# Patient Record
Sex: FEMALE | Race: WHITE | HISPANIC OR LATINO | Employment: UNEMPLOYED | URBAN - METROPOLITAN AREA
[De-identification: names, ages, dates, MRNs, and addresses within clinical notes are randomized per-mention and may not be internally consistent; named-entity substitution may affect disease eponyms.]

---

## 2020-12-21 ENCOUNTER — OFFICE VISIT (OUTPATIENT)
Dept: PSYCHIATRY | Facility: CLINIC | Age: 55
End: 2020-12-21
Payer: MEDICAID

## 2020-12-21 DIAGNOSIS — F33.1 DEPRESSION, MAJOR, RECURRENT, MODERATE: Primary | ICD-10-CM

## 2020-12-21 DIAGNOSIS — F41.9 ANXIETY DISORDER, UNSPECIFIED TYPE: ICD-10-CM

## 2020-12-21 DIAGNOSIS — F41.0 PANIC DISORDER WITHOUT AGORAPHOBIA: ICD-10-CM

## 2020-12-21 PROCEDURE — 90792 PR PSYCHIATRIC DIAGNOSTIC EVALUATION W/MEDICAL SERVICES: ICD-10-PCS | Mod: 95,AF,HB, | Performed by: PSYCHIATRY & NEUROLOGY

## 2020-12-21 PROCEDURE — 90792 PSYCH DIAG EVAL W/MED SRVCS: CPT | Mod: 95,AF,HB, | Performed by: PSYCHIATRY & NEUROLOGY

## 2020-12-21 RX ORDER — LORAZEPAM 0.5 MG/1
0.5 TABLET ORAL 3 TIMES DAILY PRN
Qty: 90 TABLET | Refills: 2 | Status: SHIPPED | OUTPATIENT
Start: 2020-12-21 | End: 2021-01-19 | Stop reason: SDUPTHER

## 2020-12-21 RX ORDER — ESCITALOPRAM OXALATE 10 MG/1
10 TABLET ORAL DAILY
Qty: 30 TABLET | Refills: 2 | Status: SHIPPED | OUTPATIENT
Start: 2020-12-21 | End: 2021-01-19 | Stop reason: SDUPTHER

## 2020-12-21 RX ORDER — MIRTAZAPINE 15 MG/1
TABLET, FILM COATED ORAL
Qty: 45 TABLET | Refills: 2 | Status: SHIPPED | OUTPATIENT
Start: 2020-12-21 | End: 2020-12-29
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
## 2020-12-26 ENCOUNTER — PATIENT MESSAGE (OUTPATIENT)
Dept: PSYCHIATRY | Facility: CLINIC | Age: 55
End: 2020-12-26

## 2020-12-29 PROBLEM — F41.9 ANXIETY DISORDER: Status: ACTIVE | Noted: 2020-12-29

## 2020-12-29 PROBLEM — F41.0 PANIC DISORDER WITHOUT AGORAPHOBIA: Status: ACTIVE | Noted: 2020-12-29

## 2020-12-29 PROBLEM — F33.1 DEPRESSION, MAJOR, RECURRENT, MODERATE: Status: ACTIVE | Noted: 2020-12-29

## 2020-12-29 RX ORDER — MIRTAZAPINE 30 MG/1
30 TABLET, FILM COATED ORAL NIGHTLY
Qty: 30 TABLET | Refills: 2 | Status: SHIPPED | OUTPATIENT
Start: 2020-12-29 | End: 2021-01-19 | Stop reason: SDUPTHER

## 2020-12-29 NOTE — PATIENT INSTRUCTIONS
PLAN:     FOLLOW UP   With D Post Donaldo BLANCHARD on 1- a 3pm  at [facility]    Meds: initial plan to advance remeron from 15 mg to 1 and 1/2 tab tho pharma not approve; as such will move to 30 mg; also wt 100#    References:     Relaxation stress reduction workbook: CATHERINE Toney PhD ( used: $7-10)    Feeling Good Website: Kei Bolanos MD / www.Foxtrot website (free) / eduardo. PODCASTS    Anxiety &  phobia workbook by ERIC Thompson PhD  (web retailers: used: $ 7-10)    [place]: Path to Better Sleep : https://www.veterantraining.[place].gov/insomnia/ (free)       Pt expressed appreciation for the visit today and did not have further question at this time though pt  was still informed to:     Call  if problems.    Call / Report Side Effects to Donaldo BLANCHARD     Encouraged to follow up with primary care / Gen Med MD for continued monitoring of general health and wellness.    Understanding was expressed; and no further concerns nor questions were raised at this time.     remember healthy self care:   eat right  attempt adequate rest   HANDWASHING / encourage such eduardo. During this corona virus time   walk or light exercise within reason and as your general med team approves  read or explore any of reference materials / homework mentioned  reach out (I.e.,  connect with)  others who nuture and bring out best in you  avoid risky behaviors  keep your appointments  IF you  cannot make your appt THEN please call or go online to reschedule.  avoid  alcohol and illicit substances.  Look for the positive.  All is often relative-seek balance  Call sooner if needed : [phone]   Call 911 or go to Emergency Room  (ER)  if any acute concerns

## 2021-01-19 ENCOUNTER — OFFICE VISIT (OUTPATIENT)
Dept: PSYCHIATRY | Facility: CLINIC | Age: 56
End: 2021-01-19
Payer: MEDICAID

## 2021-01-19 DIAGNOSIS — F33.1 DEPRESSION, MAJOR, RECURRENT, MODERATE: ICD-10-CM

## 2021-01-19 DIAGNOSIS — F41.0 PANIC DISORDER WITHOUT AGORAPHOBIA: ICD-10-CM

## 2021-01-19 DIAGNOSIS — D3A.8 NEUROENDOCRINE TUMOR: ICD-10-CM

## 2021-01-19 DIAGNOSIS — R91.8 PULMONARY NODULES: ICD-10-CM

## 2021-01-19 DIAGNOSIS — F41.9 ANXIETY DISORDER, UNSPECIFIED TYPE: Primary | ICD-10-CM

## 2021-01-19 PROCEDURE — 99214 PR OFFICE/OUTPT VISIT, EST, LEVL IV, 30-39 MIN: ICD-10-PCS | Mod: 95,AF,HB, | Performed by: PSYCHIATRY & NEUROLOGY

## 2021-01-19 PROCEDURE — 99214 OFFICE O/P EST MOD 30 MIN: CPT | Mod: 95,AF,HB, | Performed by: PSYCHIATRY & NEUROLOGY

## 2021-01-19 RX ORDER — ESCITALOPRAM OXALATE 10 MG/1
10 TABLET ORAL DAILY
Qty: 30 TABLET | Refills: 2 | Status: ON HOLD | OUTPATIENT
Start: 2021-01-19 | End: 2021-03-16 | Stop reason: HOSPADM

## 2021-01-19 RX ORDER — LORAZEPAM 0.5 MG/1
0.5 TABLET ORAL 3 TIMES DAILY PRN
Qty: 90 TABLET | Refills: 2 | Status: ON HOLD | OUTPATIENT
Start: 2021-01-19 | End: 2021-03-16 | Stop reason: HOSPADM

## 2021-01-19 RX ORDER — MIRTAZAPINE 30 MG/1
30 TABLET, FILM COATED ORAL NIGHTLY
Qty: 30 TABLET | Refills: 2 | Status: ON HOLD | OUTPATIENT
Start: 2021-01-19 | End: 2021-03-16 | Stop reason: SDUPTHER

## 2021-01-20 ENCOUNTER — TELEPHONE (OUTPATIENT)
Dept: PSYCHIATRY | Facility: CLINIC | Age: 56
End: 2021-01-20

## 2021-03-09 ENCOUNTER — PATIENT MESSAGE (OUTPATIENT)
Dept: PSYCHIATRY | Facility: CLINIC | Age: 56
End: 2021-03-09

## 2021-03-10 ENCOUNTER — PATIENT MESSAGE (OUTPATIENT)
Dept: PSYCHIATRY | Facility: CLINIC | Age: 56
End: 2021-03-10

## 2021-03-11 PROBLEM — F32.A DEPRESSION: Status: ACTIVE | Noted: 2021-03-11

## 2021-03-12 PROBLEM — S41.112A LACERATIONS OF MULTIPLE SITES OF LEFT ARM: Status: ACTIVE | Noted: 2021-03-12

## 2021-03-12 PROBLEM — K29.40 ATROPHIC GASTRITIS WITHOUT HEMORRHAGE: Status: ACTIVE | Noted: 2021-03-12

## 2021-03-12 PROBLEM — E44.1 MALNUTRITION OF MILD DEGREE: Status: ACTIVE | Noted: 2021-03-12

## 2021-03-15 ENCOUNTER — TELEPHONE (OUTPATIENT)
Dept: PSYCHIATRY | Facility: CLINIC | Age: 56
End: 2021-03-15

## 2021-03-18 ENCOUNTER — PATIENT MESSAGE (OUTPATIENT)
Dept: PSYCHIATRY | Facility: CLINIC | Age: 56
End: 2021-03-18

## 2021-03-30 ENCOUNTER — OFFICE VISIT (OUTPATIENT)
Dept: PSYCHIATRY | Facility: CLINIC | Age: 56
End: 2021-03-30
Payer: MEDICAID

## 2021-03-30 DIAGNOSIS — F41.9 ANXIETY DISORDER, UNSPECIFIED TYPE: ICD-10-CM

## 2021-03-30 DIAGNOSIS — F33.2 MDD (MAJOR DEPRESSIVE DISORDER), RECURRENT SEVERE, WITHOUT PSYCHOSIS: Primary | ICD-10-CM

## 2021-03-30 DIAGNOSIS — F41.0 PANIC DISORDER WITHOUT AGORAPHOBIA: ICD-10-CM

## 2021-03-30 DIAGNOSIS — K29.40 ATROPHIC GASTRITIS WITHOUT HEMORRHAGE: ICD-10-CM

## 2021-03-30 DIAGNOSIS — R91.8 PULMONARY NODULES: ICD-10-CM

## 2021-03-30 DIAGNOSIS — D3A.8 NEUROENDOCRINE TUMOR: ICD-10-CM

## 2021-03-30 PROCEDURE — 99215 OFFICE O/P EST HI 40 MIN: CPT | Mod: 95,AF,HB, | Performed by: PSYCHIATRY & NEUROLOGY

## 2021-03-30 PROCEDURE — 99215 PR OFFICE/OUTPT VISIT, EST, LEVL V, 40-54 MIN: ICD-10-PCS | Mod: 95,AF,HB, | Performed by: PSYCHIATRY & NEUROLOGY

## 2021-03-31 RX ORDER — ESCITALOPRAM OXALATE 20 MG/1
20 TABLET ORAL EVERY MORNING
Qty: 30 TABLET | Refills: 1 | Status: SHIPPED | OUTPATIENT
Start: 2021-03-31 | End: 2021-04-27 | Stop reason: SDUPTHER

## 2021-03-31 RX ORDER — MIRTAZAPINE 45 MG/1
45 TABLET, FILM COATED ORAL NIGHTLY
Qty: 30 TABLET | Refills: 1 | Status: SHIPPED | OUTPATIENT
Start: 2021-03-31 | End: 2021-04-27 | Stop reason: SDUPTHER

## 2021-03-31 RX ORDER — ARIPIPRAZOLE 5 MG/1
5 TABLET ORAL NIGHTLY
Qty: 30 TABLET | Refills: 1 | Status: SHIPPED | OUTPATIENT
Start: 2021-03-31 | End: 2021-04-27 | Stop reason: SDUPTHER

## 2021-03-31 RX ORDER — LORAZEPAM 0.5 MG/1
0.5 TABLET ORAL 2 TIMES DAILY PRN
Qty: 60 TABLET | Refills: 1 | Status: SHIPPED | OUTPATIENT
Start: 2021-04-04 | End: 2021-04-27 | Stop reason: SDUPTHER

## 2021-04-19 ENCOUNTER — OFFICE VISIT (OUTPATIENT)
Dept: PSYCHIATRY | Facility: CLINIC | Age: 56
End: 2021-04-19
Payer: MEDICAID

## 2021-04-19 DIAGNOSIS — F41.1 GENERALIZED ANXIETY DISORDER WITH PANIC ATTACKS: ICD-10-CM

## 2021-04-19 DIAGNOSIS — F41.0 GENERALIZED ANXIETY DISORDER WITH PANIC ATTACKS: ICD-10-CM

## 2021-04-19 DIAGNOSIS — F33.2 MDD (MAJOR DEPRESSIVE DISORDER), RECURRENT SEVERE, WITHOUT PSYCHOSIS: Primary | ICD-10-CM

## 2021-04-19 PROCEDURE — 90791 PR PSYCHIATRIC DIAGNOSTIC EVALUATION: ICD-10-PCS | Mod: AJ,HB,95, | Performed by: SOCIAL WORKER

## 2021-04-19 PROCEDURE — 90791 PSYCH DIAGNOSTIC EVALUATION: CPT | Mod: AJ,HB,95, | Performed by: SOCIAL WORKER

## 2021-04-27 ENCOUNTER — OFFICE VISIT (OUTPATIENT)
Dept: PSYCHIATRY | Facility: CLINIC | Age: 56
End: 2021-04-27
Payer: MEDICAID

## 2021-04-27 VITALS
HEART RATE: 77 BPM | SYSTOLIC BLOOD PRESSURE: 116 MMHG | DIASTOLIC BLOOD PRESSURE: 69 MMHG | WEIGHT: 102.63 LBS | BODY MASS INDEX: 18.77 KG/M2

## 2021-04-27 DIAGNOSIS — F41.0 PANIC DISORDER WITHOUT AGORAPHOBIA: ICD-10-CM

## 2021-04-27 DIAGNOSIS — D3A.8 NEUROENDOCRINE TUMOR: ICD-10-CM

## 2021-04-27 DIAGNOSIS — T42.4X4S: ICD-10-CM

## 2021-04-27 DIAGNOSIS — F41.9 ANXIETY DISORDER, UNSPECIFIED TYPE: ICD-10-CM

## 2021-04-27 DIAGNOSIS — F33.2 MDD (MAJOR DEPRESSIVE DISORDER), RECURRENT SEVERE, WITHOUT PSYCHOSIS: Primary | ICD-10-CM

## 2021-04-27 PROCEDURE — 99212 OFFICE O/P EST SF 10 MIN: CPT | Mod: PBBFAC | Performed by: PSYCHIATRY & NEUROLOGY

## 2021-04-27 PROCEDURE — 99999 PR PBB SHADOW E&M-EST. PATIENT-LVL II: CPT | Mod: PBBFAC,AF,HB, | Performed by: PSYCHIATRY & NEUROLOGY

## 2021-04-27 PROCEDURE — 99999 PR PBB SHADOW E&M-EST. PATIENT-LVL II: ICD-10-PCS | Mod: PBBFAC,AF,HB, | Performed by: PSYCHIATRY & NEUROLOGY

## 2021-04-27 PROCEDURE — 99214 OFFICE O/P EST MOD 30 MIN: CPT | Mod: AF,HB,S$PBB, | Performed by: PSYCHIATRY & NEUROLOGY

## 2021-04-27 PROCEDURE — 99214 PR OFFICE/OUTPT VISIT, EST, LEVL IV, 30-39 MIN: ICD-10-PCS | Mod: AF,HB,S$PBB, | Performed by: PSYCHIATRY & NEUROLOGY

## 2021-04-27 RX ORDER — ESCITALOPRAM OXALATE 20 MG/1
20 TABLET ORAL EVERY MORNING
Qty: 30 TABLET | Refills: 1 | Status: SHIPPED | OUTPATIENT
Start: 2021-04-27 | End: 2021-06-08 | Stop reason: SDUPTHER

## 2021-04-27 RX ORDER — ARIPIPRAZOLE 5 MG/1
5 TABLET ORAL NIGHTLY
Qty: 30 TABLET | Refills: 1 | Status: SHIPPED | OUTPATIENT
Start: 2021-04-27 | End: 2021-06-08 | Stop reason: SDUPTHER

## 2021-04-27 RX ORDER — LORAZEPAM 0.5 MG/1
0.5 TABLET ORAL 2 TIMES DAILY PRN
Qty: 60 TABLET | Refills: 1 | Status: SHIPPED | OUTPATIENT
Start: 2021-04-27 | End: 2021-06-08 | Stop reason: SDUPTHER

## 2021-04-27 RX ORDER — MIRTAZAPINE 45 MG/1
45 TABLET, FILM COATED ORAL NIGHTLY
Qty: 30 TABLET | Refills: 1 | Status: SHIPPED | OUTPATIENT
Start: 2021-04-27 | End: 2021-06-08 | Stop reason: SDUPTHER

## 2021-06-08 ENCOUNTER — OFFICE VISIT (OUTPATIENT)
Dept: PSYCHIATRY | Facility: CLINIC | Age: 56
End: 2021-06-08
Payer: MEDICAID

## 2021-06-08 VITALS
DIASTOLIC BLOOD PRESSURE: 65 MMHG | BODY MASS INDEX: 20.36 KG/M2 | SYSTOLIC BLOOD PRESSURE: 115 MMHG | WEIGHT: 111.31 LBS | HEART RATE: 72 BPM

## 2021-06-08 DIAGNOSIS — T42.4X4S: ICD-10-CM

## 2021-06-08 DIAGNOSIS — D3A.8 NEUROENDOCRINE TUMOR: ICD-10-CM

## 2021-06-08 DIAGNOSIS — R91.8 PULMONARY NODULES: ICD-10-CM

## 2021-06-08 DIAGNOSIS — F33.2 MDD (MAJOR DEPRESSIVE DISORDER), RECURRENT SEVERE, WITHOUT PSYCHOSIS: Primary | ICD-10-CM

## 2021-06-08 DIAGNOSIS — F33.1 DEPRESSION, MAJOR, RECURRENT, MODERATE: ICD-10-CM

## 2021-06-08 DIAGNOSIS — K29.40 ATROPHIC GASTRITIS WITHOUT HEMORRHAGE: ICD-10-CM

## 2021-06-08 DIAGNOSIS — F41.9 ANXIETY DISORDER, UNSPECIFIED TYPE: ICD-10-CM

## 2021-06-08 DIAGNOSIS — F41.0 PANIC DISORDER WITHOUT AGORAPHOBIA: ICD-10-CM

## 2021-06-08 PROCEDURE — 99999 PR PBB SHADOW E&M-EST. PATIENT-LVL II: ICD-10-PCS | Mod: PBBFAC,AF,HB, | Performed by: PSYCHIATRY & NEUROLOGY

## 2021-06-08 PROCEDURE — 99214 PR OFFICE/OUTPT VISIT, EST, LEVL IV, 30-39 MIN: ICD-10-PCS | Mod: AF,HB,S$PBB, | Performed by: PSYCHIATRY & NEUROLOGY

## 2021-06-08 PROCEDURE — 99212 OFFICE O/P EST SF 10 MIN: CPT | Mod: PBBFAC | Performed by: PSYCHIATRY & NEUROLOGY

## 2021-06-08 PROCEDURE — 99999 PR PBB SHADOW E&M-EST. PATIENT-LVL II: CPT | Mod: PBBFAC,AF,HB, | Performed by: PSYCHIATRY & NEUROLOGY

## 2021-06-08 PROCEDURE — 99214 OFFICE O/P EST MOD 30 MIN: CPT | Mod: AF,HB,S$PBB, | Performed by: PSYCHIATRY & NEUROLOGY

## 2021-06-08 RX ORDER — LORAZEPAM 0.5 MG/1
0.5 TABLET ORAL 2 TIMES DAILY PRN
Qty: 60 TABLET | Refills: 2 | Status: SHIPPED | OUTPATIENT
Start: 2021-07-02 | End: 2021-08-03

## 2021-06-08 RX ORDER — MIRTAZAPINE 45 MG/1
45 TABLET, FILM COATED ORAL NIGHTLY
Qty: 30 TABLET | Refills: 2 | Status: SHIPPED | OUTPATIENT
Start: 2021-06-08 | End: 2021-08-03 | Stop reason: SDUPTHER

## 2021-06-08 RX ORDER — ARIPIPRAZOLE 5 MG/1
5 TABLET ORAL NIGHTLY
Qty: 30 TABLET | Refills: 2 | Status: SHIPPED | OUTPATIENT
Start: 2021-06-08 | End: 2021-08-03 | Stop reason: SDUPTHER

## 2021-06-08 RX ORDER — ESCITALOPRAM OXALATE 20 MG/1
20 TABLET ORAL EVERY MORNING
Qty: 30 TABLET | Refills: 2 | Status: SHIPPED | OUTPATIENT
Start: 2021-06-08 | End: 2021-09-01 | Stop reason: SDUPTHER

## 2021-06-30 ENCOUNTER — OFFICE VISIT (OUTPATIENT)
Dept: PSYCHIATRY | Facility: CLINIC | Age: 56
End: 2021-06-30
Payer: MEDICAID

## 2021-06-30 DIAGNOSIS — F41.0 PANIC DISORDER WITHOUT AGORAPHOBIA: ICD-10-CM

## 2021-06-30 DIAGNOSIS — F41.9 ANXIETY DISORDER, UNSPECIFIED TYPE: ICD-10-CM

## 2021-06-30 DIAGNOSIS — F33.2 MDD (MAJOR DEPRESSIVE DISORDER), RECURRENT SEVERE, WITHOUT PSYCHOSIS: Primary | ICD-10-CM

## 2021-06-30 PROCEDURE — 90834 PR PSYCHOTHERAPY W/PATIENT, 45 MIN: ICD-10-PCS | Mod: 95,AJ,HB, | Performed by: SOCIAL WORKER

## 2021-06-30 PROCEDURE — 90834 PSYTX W PT 45 MINUTES: CPT | Mod: 95,AJ,HB, | Performed by: SOCIAL WORKER

## 2021-08-03 ENCOUNTER — OFFICE VISIT (OUTPATIENT)
Dept: PSYCHIATRY | Facility: CLINIC | Age: 56
End: 2021-08-03
Payer: MEDICAID

## 2021-08-03 DIAGNOSIS — M81.0 OSTEOPOROSIS, UNSPECIFIED OSTEOPOROSIS TYPE, UNSPECIFIED PATHOLOGICAL FRACTURE PRESENCE: ICD-10-CM

## 2021-08-03 DIAGNOSIS — K29.40 ATROPHIC GASTRITIS WITHOUT HEMORRHAGE: ICD-10-CM

## 2021-08-03 DIAGNOSIS — D3A.8 NEUROENDOCRINE TUMOR: ICD-10-CM

## 2021-08-03 DIAGNOSIS — M54.9 BACK PAIN, UNSPECIFIED BACK LOCATION, UNSPECIFIED BACK PAIN LATERALITY, UNSPECIFIED CHRONICITY: ICD-10-CM

## 2021-08-03 DIAGNOSIS — F41.9 ANXIETY DISORDER, UNSPECIFIED TYPE: ICD-10-CM

## 2021-08-03 DIAGNOSIS — F41.0 PANIC DISORDER WITHOUT AGORAPHOBIA: ICD-10-CM

## 2021-08-03 DIAGNOSIS — T42.4X4S: ICD-10-CM

## 2021-08-03 DIAGNOSIS — R91.8 PULMONARY NODULES: ICD-10-CM

## 2021-08-03 DIAGNOSIS — F33.2 MDD (MAJOR DEPRESSIVE DISORDER), RECURRENT SEVERE, WITHOUT PSYCHOSIS: Primary | ICD-10-CM

## 2021-08-03 PROCEDURE — 99215 OFFICE O/P EST HI 40 MIN: CPT | Mod: S$PBB,AF,HB, | Performed by: PSYCHIATRY & NEUROLOGY

## 2021-08-03 PROCEDURE — 99999 PR PBB SHADOW E&M-EST. PATIENT-LVL I: CPT | Mod: PBBFAC,AF,HB, | Performed by: PSYCHIATRY & NEUROLOGY

## 2021-08-03 PROCEDURE — 99211 OFF/OP EST MAY X REQ PHY/QHP: CPT | Mod: PBBFAC | Performed by: PSYCHIATRY & NEUROLOGY

## 2021-08-03 PROCEDURE — 99215 PR OFFICE/OUTPT VISIT, EST, LEVL V, 40-54 MIN: ICD-10-PCS | Mod: S$PBB,AF,HB, | Performed by: PSYCHIATRY & NEUROLOGY

## 2021-08-03 PROCEDURE — 99999 PR PBB SHADOW E&M-EST. PATIENT-LVL I: ICD-10-PCS | Mod: PBBFAC,AF,HB, | Performed by: PSYCHIATRY & NEUROLOGY

## 2021-08-03 RX ORDER — MIRTAZAPINE 45 MG/1
45 TABLET, FILM COATED ORAL NIGHTLY
Qty: 30 TABLET | Refills: 2 | Status: SHIPPED | OUTPATIENT
Start: 2021-08-03 | End: 2021-09-01 | Stop reason: SDUPTHER

## 2021-08-03 RX ORDER — ARIPIPRAZOLE 5 MG/1
5 TABLET ORAL NIGHTLY
Qty: 30 TABLET | Refills: 2 | Status: SHIPPED | OUTPATIENT
Start: 2021-08-03 | End: 2021-08-12

## 2021-08-03 RX ORDER — LORAZEPAM 0.5 MG/1
0.5 TABLET ORAL 3 TIMES DAILY PRN
Qty: 90 TABLET | Refills: 2 | Status: SHIPPED | OUTPATIENT
Start: 2021-08-03 | End: 2021-09-01 | Stop reason: SDUPTHER

## 2021-08-12 ENCOUNTER — TELEPHONE (OUTPATIENT)
Dept: PSYCHIATRY | Facility: CLINIC | Age: 56
End: 2021-08-12

## 2021-08-12 ENCOUNTER — PATIENT MESSAGE (OUTPATIENT)
Dept: PSYCHIATRY | Facility: CLINIC | Age: 56
End: 2021-08-12

## 2021-08-12 DIAGNOSIS — F33.2 MDD (MAJOR DEPRESSIVE DISORDER), RECURRENT SEVERE, WITHOUT PSYCHOSIS: Primary | ICD-10-CM

## 2021-08-12 RX ORDER — ARIPIPRAZOLE 10 MG/1
10 TABLET ORAL DAILY
Qty: 30 TABLET | Refills: 2 | Status: SHIPPED | OUTPATIENT
Start: 2021-08-12 | End: 2021-09-01 | Stop reason: SDUPTHER

## 2021-08-31 ENCOUNTER — PATIENT MESSAGE (OUTPATIENT)
Dept: PSYCHIATRY | Facility: CLINIC | Age: 56
End: 2021-08-31

## 2021-09-01 ENCOUNTER — OFFICE VISIT (OUTPATIENT)
Dept: PSYCHIATRY | Facility: CLINIC | Age: 56
End: 2021-09-01
Payer: MEDICAID

## 2021-09-01 DIAGNOSIS — T42.4X4S: ICD-10-CM

## 2021-09-01 DIAGNOSIS — F33.2 MDD (MAJOR DEPRESSIVE DISORDER), RECURRENT SEVERE, WITHOUT PSYCHOSIS: Primary | ICD-10-CM

## 2021-09-01 DIAGNOSIS — M54.9 BACK PAIN, UNSPECIFIED BACK LOCATION, UNSPECIFIED BACK PAIN LATERALITY, UNSPECIFIED CHRONICITY: ICD-10-CM

## 2021-09-01 DIAGNOSIS — F41.9 ANXIETY DISORDER, UNSPECIFIED TYPE: ICD-10-CM

## 2021-09-01 DIAGNOSIS — R91.8 PULMONARY NODULES: ICD-10-CM

## 2021-09-01 DIAGNOSIS — D3A.8 NEUROENDOCRINE TUMOR: ICD-10-CM

## 2021-09-01 DIAGNOSIS — G47.00 INSOMNIA, UNSPECIFIED TYPE: ICD-10-CM

## 2021-09-01 DIAGNOSIS — K29.40 ATROPHIC GASTRITIS WITHOUT HEMORRHAGE: ICD-10-CM

## 2021-09-01 DIAGNOSIS — F41.0 PANIC DISORDER WITHOUT AGORAPHOBIA: ICD-10-CM

## 2021-09-01 DIAGNOSIS — M81.0 OSTEOPOROSIS, UNSPECIFIED OSTEOPOROSIS TYPE, UNSPECIFIED PATHOLOGICAL FRACTURE PRESENCE: ICD-10-CM

## 2021-09-01 PROCEDURE — 99215 OFFICE O/P EST HI 40 MIN: CPT | Mod: 95,AF,HB, | Performed by: PSYCHIATRY & NEUROLOGY

## 2021-09-01 PROCEDURE — 99215 PR OFFICE/OUTPT VISIT, EST, LEVL V, 40-54 MIN: ICD-10-PCS | Mod: 95,AF,HB, | Performed by: PSYCHIATRY & NEUROLOGY

## 2021-09-01 RX ORDER — MIRTAZAPINE 45 MG/1
45 TABLET, FILM COATED ORAL NIGHTLY
Qty: 30 TABLET | Refills: 2 | Status: SHIPPED | OUTPATIENT
Start: 2021-09-01 | End: 2021-10-11 | Stop reason: SDUPTHER

## 2021-09-01 RX ORDER — TRAZODONE HYDROCHLORIDE 100 MG/1
200-300 TABLET ORAL NIGHTLY
Qty: 90 TABLET | Refills: 0 | Status: SHIPPED | OUTPATIENT
Start: 2021-09-01 | End: 2021-09-13 | Stop reason: SDUPTHER

## 2021-09-01 RX ORDER — ARIPIPRAZOLE 10 MG/1
10 TABLET ORAL DAILY
Qty: 30 TABLET | Refills: 2 | Status: SHIPPED | OUTPATIENT
Start: 2021-09-01 | End: 2021-10-11 | Stop reason: SDUPTHER

## 2021-09-01 RX ORDER — LORAZEPAM 0.5 MG/1
0.5 TABLET ORAL 3 TIMES DAILY PRN
Qty: 90 TABLET | Refills: 2 | Status: SHIPPED | OUTPATIENT
Start: 2021-09-01 | End: 2021-10-11

## 2021-09-01 RX ORDER — ESCITALOPRAM OXALATE 20 MG/1
20 TABLET ORAL EVERY MORNING
Qty: 30 TABLET | Refills: 2 | Status: SHIPPED | OUTPATIENT
Start: 2021-09-01 | End: 2021-10-11 | Stop reason: SDUPTHER

## 2021-09-13 ENCOUNTER — OFFICE VISIT (OUTPATIENT)
Dept: PSYCHIATRY | Facility: CLINIC | Age: 56
End: 2021-09-13
Payer: MEDICAID

## 2021-09-13 DIAGNOSIS — F33.2 MDD (MAJOR DEPRESSIVE DISORDER), RECURRENT SEVERE, WITHOUT PSYCHOSIS: Primary | ICD-10-CM

## 2021-09-13 DIAGNOSIS — R91.8 PULMONARY NODULES: ICD-10-CM

## 2021-09-13 DIAGNOSIS — T42.4X4S: ICD-10-CM

## 2021-09-13 DIAGNOSIS — G47.00 INSOMNIA, UNSPECIFIED TYPE: ICD-10-CM

## 2021-09-13 DIAGNOSIS — K29.40 ATROPHIC GASTRITIS WITHOUT HEMORRHAGE: ICD-10-CM

## 2021-09-13 DIAGNOSIS — F41.9 ANXIETY DISORDER, UNSPECIFIED TYPE: ICD-10-CM

## 2021-09-13 DIAGNOSIS — F41.0 PANIC DISORDER WITHOUT AGORAPHOBIA: ICD-10-CM

## 2021-09-13 DIAGNOSIS — M54.9 BACK PAIN, UNSPECIFIED BACK LOCATION, UNSPECIFIED BACK PAIN LATERALITY, UNSPECIFIED CHRONICITY: ICD-10-CM

## 2021-09-13 DIAGNOSIS — D3A.8 NEUROENDOCRINE TUMOR: ICD-10-CM

## 2021-09-13 PROCEDURE — 99214 PR OFFICE/OUTPT VISIT, EST, LEVL IV, 30-39 MIN: ICD-10-PCS | Mod: 95,AF,HB, | Performed by: PSYCHIATRY & NEUROLOGY

## 2021-09-13 PROCEDURE — 99214 OFFICE O/P EST MOD 30 MIN: CPT | Mod: 95,AF,HB, | Performed by: PSYCHIATRY & NEUROLOGY

## 2021-09-13 RX ORDER — TRAZODONE HYDROCHLORIDE 100 MG/1
200 TABLET ORAL NIGHTLY
Qty: 60 TABLET | Refills: 1 | Status: SHIPPED | OUTPATIENT
Start: 2021-10-01 | End: 2021-10-11 | Stop reason: SDUPTHER

## 2021-09-13 RX ORDER — LORAZEPAM 1 MG/1
1 TABLET ORAL NIGHTLY PRN
Qty: 30 TABLET | Refills: 1 | Status: SHIPPED | OUTPATIENT
Start: 2021-09-13 | End: 2021-10-11

## 2021-10-11 ENCOUNTER — OFFICE VISIT (OUTPATIENT)
Dept: PSYCHIATRY | Facility: CLINIC | Age: 56
End: 2021-10-11
Payer: MEDICAID

## 2021-10-11 DIAGNOSIS — D3A.8 NEUROENDOCRINE TUMOR: ICD-10-CM

## 2021-10-11 DIAGNOSIS — F41.0 PANIC DISORDER WITHOUT AGORAPHOBIA: ICD-10-CM

## 2021-10-11 DIAGNOSIS — M54.9 BACK PAIN, UNSPECIFIED BACK LOCATION, UNSPECIFIED BACK PAIN LATERALITY, UNSPECIFIED CHRONICITY: ICD-10-CM

## 2021-10-11 DIAGNOSIS — K29.40 ATROPHIC GASTRITIS WITHOUT HEMORRHAGE: ICD-10-CM

## 2021-10-11 DIAGNOSIS — T42.4X4S: ICD-10-CM

## 2021-10-11 DIAGNOSIS — M81.0 OSTEOPOROSIS, UNSPECIFIED OSTEOPOROSIS TYPE, UNSPECIFIED PATHOLOGICAL FRACTURE PRESENCE: ICD-10-CM

## 2021-10-11 DIAGNOSIS — F33.2 MDD (MAJOR DEPRESSIVE DISORDER), RECURRENT SEVERE, WITHOUT PSYCHOSIS: Primary | ICD-10-CM

## 2021-10-11 DIAGNOSIS — G47.00 INSOMNIA, UNSPECIFIED TYPE: ICD-10-CM

## 2021-10-11 DIAGNOSIS — F41.9 ANXIETY DISORDER, UNSPECIFIED TYPE: ICD-10-CM

## 2021-10-11 PROCEDURE — 99214 PR OFFICE/OUTPT VISIT, EST, LEVL IV, 30-39 MIN: ICD-10-PCS | Mod: 95,AF,HB, | Performed by: PSYCHIATRY & NEUROLOGY

## 2021-10-11 PROCEDURE — 99214 OFFICE O/P EST MOD 30 MIN: CPT | Mod: 95,AF,HB, | Performed by: PSYCHIATRY & NEUROLOGY

## 2021-10-11 RX ORDER — ESCITALOPRAM OXALATE 20 MG/1
20 TABLET ORAL EVERY MORNING
Qty: 30 TABLET | Refills: 2 | Status: SHIPPED | OUTPATIENT
Start: 2021-10-11 | End: 2021-11-30 | Stop reason: SDUPTHER

## 2021-10-11 RX ORDER — ARIPIPRAZOLE 10 MG/1
10 TABLET ORAL DAILY
Qty: 30 TABLET | Refills: 2 | Status: SHIPPED | OUTPATIENT
Start: 2021-10-11 | End: 2021-11-30 | Stop reason: SDUPTHER

## 2021-10-11 RX ORDER — MIRTAZAPINE 45 MG/1
45 TABLET, FILM COATED ORAL NIGHTLY
Qty: 30 TABLET | Refills: 2 | Status: SHIPPED | OUTPATIENT
Start: 2021-10-11 | End: 2021-11-30 | Stop reason: SDUPTHER

## 2021-10-11 RX ORDER — LORAZEPAM 1 MG/1
1 TABLET ORAL 3 TIMES DAILY PRN
Qty: 90 TABLET | Refills: 1 | Status: SHIPPED | OUTPATIENT
Start: 2021-10-11 | End: 2021-11-30 | Stop reason: SDUPTHER

## 2021-10-11 RX ORDER — TRAZODONE HYDROCHLORIDE 100 MG/1
100 TABLET ORAL NIGHTLY
Qty: 30 TABLET | Refills: 2 | Status: SHIPPED | OUTPATIENT
Start: 2021-10-11 | End: 2021-11-30 | Stop reason: SDUPTHER

## 2021-11-30 ENCOUNTER — OFFICE VISIT (OUTPATIENT)
Dept: PSYCHIATRY | Facility: CLINIC | Age: 56
End: 2021-11-30
Payer: MEDICAID

## 2021-11-30 DIAGNOSIS — G47.00 INSOMNIA, UNSPECIFIED TYPE: ICD-10-CM

## 2021-11-30 DIAGNOSIS — F41.9 ANXIETY DISORDER, UNSPECIFIED TYPE: ICD-10-CM

## 2021-11-30 DIAGNOSIS — F33.2 MDD (MAJOR DEPRESSIVE DISORDER), RECURRENT SEVERE, WITHOUT PSYCHOSIS: Primary | ICD-10-CM

## 2021-11-30 DIAGNOSIS — M54.9 BACK PAIN, UNSPECIFIED BACK LOCATION, UNSPECIFIED BACK PAIN LATERALITY, UNSPECIFIED CHRONICITY: ICD-10-CM

## 2021-11-30 DIAGNOSIS — T42.4X4S: ICD-10-CM

## 2021-11-30 DIAGNOSIS — D3A.8 NEUROENDOCRINE TUMOR: ICD-10-CM

## 2021-11-30 DIAGNOSIS — M81.0 OSTEOPOROSIS, UNSPECIFIED OSTEOPOROSIS TYPE, UNSPECIFIED PATHOLOGICAL FRACTURE PRESENCE: ICD-10-CM

## 2021-11-30 DIAGNOSIS — F41.0 PANIC DISORDER WITHOUT AGORAPHOBIA: ICD-10-CM

## 2021-11-30 DIAGNOSIS — K29.40 ATROPHIC GASTRITIS WITHOUT HEMORRHAGE: ICD-10-CM

## 2021-11-30 PROCEDURE — 99214 PR OFFICE/OUTPT VISIT, EST, LEVL IV, 30-39 MIN: ICD-10-PCS | Mod: AF,HB,95, | Performed by: PSYCHIATRY & NEUROLOGY

## 2021-11-30 PROCEDURE — 99214 OFFICE O/P EST MOD 30 MIN: CPT | Mod: AF,HB,95, | Performed by: PSYCHIATRY & NEUROLOGY

## 2021-11-30 RX ORDER — LORAZEPAM 1 MG/1
1 TABLET ORAL 3 TIMES DAILY PRN
Qty: 90 TABLET | Refills: 2 | Status: SHIPPED | OUTPATIENT
Start: 2021-12-18 | End: 2022-02-07 | Stop reason: SDUPTHER

## 2021-11-30 RX ORDER — ARIPIPRAZOLE 10 MG/1
10 TABLET ORAL DAILY
Qty: 30 TABLET | Refills: 2 | Status: SHIPPED | OUTPATIENT
Start: 2021-11-30 | End: 2022-02-07 | Stop reason: SDUPTHER

## 2021-11-30 RX ORDER — TRAZODONE HYDROCHLORIDE 100 MG/1
100 TABLET ORAL NIGHTLY
Qty: 30 TABLET | Refills: 2 | Status: SHIPPED | OUTPATIENT
Start: 2021-11-30 | End: 2022-01-11

## 2021-11-30 RX ORDER — MIRTAZAPINE 45 MG/1
45 TABLET, FILM COATED ORAL NIGHTLY
Qty: 30 TABLET | Refills: 2 | Status: SHIPPED | OUTPATIENT
Start: 2021-11-30 | End: 2022-02-07 | Stop reason: SDUPTHER

## 2021-11-30 RX ORDER — ESCITALOPRAM OXALATE 20 MG/1
20 TABLET ORAL EVERY MORNING
Qty: 30 TABLET | Refills: 2 | Status: SHIPPED | OUTPATIENT
Start: 2021-11-30 | End: 2022-02-07

## 2022-01-11 ENCOUNTER — PATIENT MESSAGE (OUTPATIENT)
Dept: PSYCHIATRY | Facility: CLINIC | Age: 57
End: 2022-01-11
Payer: MEDICAID

## 2022-01-11 DIAGNOSIS — G47.00 INSOMNIA, UNSPECIFIED TYPE: Primary | ICD-10-CM

## 2022-01-11 RX ORDER — TRAZODONE HYDROCHLORIDE 100 MG/1
200-300 TABLET ORAL NIGHTLY PRN
Qty: 90 TABLET | Refills: 2 | Status: SHIPPED | OUTPATIENT
Start: 2022-01-11 | End: 2022-02-07 | Stop reason: SDUPTHER

## 2022-01-12 NOTE — PROGRESS NOTES
In reply to message today:    Est pt:   Would it be possible on the upcoming Trazadone refill,  for you to increase to 3 100mg  tablets.  I tried 2 tabs but Im  waking up very early and not getting enough rest.  With 3 tablets Im able to sleep and rest longer.  Anticipated thanks Dr Vizcarra.       Order sent to trazodone 100mg    2-3 at bed prn insomnia #390 x 2 refill    Has appt in early Feb 2022    D Carmita BLANCHARD

## 2022-01-13 ENCOUNTER — PATIENT MESSAGE (OUTPATIENT)
Dept: PSYCHIATRY | Facility: CLINIC | Age: 57
End: 2022-01-13
Payer: MEDICAID

## 2022-02-07 ENCOUNTER — OFFICE VISIT (OUTPATIENT)
Dept: PSYCHIATRY | Facility: CLINIC | Age: 57
End: 2022-02-07
Payer: MEDICAID

## 2022-02-07 DIAGNOSIS — T42.4X4S: ICD-10-CM

## 2022-02-07 DIAGNOSIS — K29.40 ATROPHIC GASTRITIS WITHOUT HEMORRHAGE: ICD-10-CM

## 2022-02-07 DIAGNOSIS — M81.0 OSTEOPOROSIS, UNSPECIFIED OSTEOPOROSIS TYPE, UNSPECIFIED PATHOLOGICAL FRACTURE PRESENCE: ICD-10-CM

## 2022-02-07 DIAGNOSIS — G47.00 INSOMNIA, UNSPECIFIED TYPE: ICD-10-CM

## 2022-02-07 DIAGNOSIS — F33.2 MDD (MAJOR DEPRESSIVE DISORDER), RECURRENT SEVERE, WITHOUT PSYCHOSIS: Primary | ICD-10-CM

## 2022-02-07 DIAGNOSIS — F41.0 PANIC DISORDER WITHOUT AGORAPHOBIA: ICD-10-CM

## 2022-02-07 DIAGNOSIS — D3A.8 NEUROENDOCRINE TUMOR: ICD-10-CM

## 2022-02-07 DIAGNOSIS — M54.9 BACK PAIN, UNSPECIFIED BACK LOCATION, UNSPECIFIED BACK PAIN LATERALITY, UNSPECIFIED CHRONICITY: ICD-10-CM

## 2022-02-07 DIAGNOSIS — F41.9 ANXIETY DISORDER, UNSPECIFIED TYPE: ICD-10-CM

## 2022-02-07 PROCEDURE — 99214 PR OFFICE/OUTPT VISIT, EST, LEVL IV, 30-39 MIN: ICD-10-PCS | Mod: AF,HB,95, | Performed by: PSYCHIATRY & NEUROLOGY

## 2022-02-07 PROCEDURE — 99214 OFFICE O/P EST MOD 30 MIN: CPT | Mod: AF,HB,95, | Performed by: PSYCHIATRY & NEUROLOGY

## 2022-02-07 RX ORDER — LORAZEPAM 1 MG/1
1 TABLET ORAL 3 TIMES DAILY PRN
Qty: 90 TABLET | Refills: 2 | Status: SHIPPED | OUTPATIENT
Start: 2022-02-11 | End: 2022-03-28 | Stop reason: SDUPTHER

## 2022-02-07 RX ORDER — MIRTAZAPINE 45 MG/1
45 TABLET, FILM COATED ORAL NIGHTLY
Qty: 30 TABLET | Refills: 2 | Status: SHIPPED | OUTPATIENT
Start: 2022-02-07 | End: 2022-03-28 | Stop reason: SDUPTHER

## 2022-02-07 RX ORDER — ESCITALOPRAM OXALATE 10 MG/1
10 TABLET ORAL DAILY
Qty: 30 TABLET | Refills: 2 | Status: SHIPPED | OUTPATIENT
Start: 2022-02-07 | End: 2022-03-28

## 2022-02-07 RX ORDER — ARIPIPRAZOLE 10 MG/1
10 TABLET ORAL DAILY
Qty: 30 TABLET | Refills: 2 | Status: SHIPPED | OUTPATIENT
Start: 2022-02-07 | End: 2022-03-28 | Stop reason: SDUPTHER

## 2022-02-07 RX ORDER — DULOXETIN HYDROCHLORIDE 30 MG/1
30 CAPSULE, DELAYED RELEASE ORAL DAILY
Qty: 30 CAPSULE | Refills: 2 | Status: SHIPPED | OUTPATIENT
Start: 2022-02-07 | End: 2022-03-28

## 2022-02-07 RX ORDER — TRAZODONE HYDROCHLORIDE 100 MG/1
200-300 TABLET ORAL NIGHTLY PRN
Qty: 90 TABLET | Refills: 2 | Status: SHIPPED | OUTPATIENT
Start: 2022-02-07 | End: 2022-03-28 | Stop reason: SDUPTHER
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
## 2022-02-07 NOTE — PATIENT INSTRUCTIONS
"  PLAN:     Requests Follow up Mar 28 aim 3pm TELEHEALTH YET PATIENT  NEEDS TO CONFIRM APPOINTMENT  by seeing such appointment  appear on their "My Chart" dalila.  NOTE:  IF  appointment is not visible, THEN patient is  instructed to call  [phone] and coordinate appointment scheduling with . She will reschedule IF has other med appt conflicts tho will move to put this appt in place.    Psychiatry Medication:    Add cymbalta 30 mg / decrease lexapro to 10 mg (from 20 mg/ see FULL med list /  See after visit summary      Encourage her to review with PCP / options back pain mngt    >>  References:     Relaxation stress reduction workbook: CATHERINE Toney PhD ( used: $7-10)    Feeling Good Website: Kei Bolanos MD / www.[employer] website (free) / eduardo. PODCASTS    Anxiety &  phobia workbook by ERIC Thompson PhD  (web retailers: used: $ 7-10)    [place]: Path to Better Sleep : https://www.veterantraining.[place]insomnia/ (free)    [place]: http://www.quitwithusla.org/    (and other resources as per counselor, if applicable)     Pt expressed appreciation for the visit today and did not have further question at this time though pt  was still informed to:     Call / Report Side Effects to Psyc MD     Encouraged to follow up with primary care / Gen Med MD for continued monitoring of general health and wellness.    Understanding was expressed; and no further concerns nor questions were raised at this time.     remember healthy self care:   eat right  attempt adequate rest   HANDWASHING / encourage such eduardo. During this corona virus time   walk or light exercise within reason and as your general med team approves  read or explore any of reference materials / homework mentioned  reach out (I.e.,  connect with)  others who nuture and bring out best in you  avoid risky behaviors  keep your appointments  IF you  cannot make your appt THEN please call or go online to reschedule.  avoid  alcohol and illicit " substances.  Look for the positive.  All is often relative-seek balance  Call [facility] Clinic  if questions : [phone]   Call 911 or go to Emergency Room  (ER)  if any acute concerns

## 2022-03-28 ENCOUNTER — OFFICE VISIT (OUTPATIENT)
Dept: PSYCHIATRY | Facility: CLINIC | Age: 57
End: 2022-03-28
Payer: MEDICAID

## 2022-03-28 DIAGNOSIS — D3A.8 NEUROENDOCRINE TUMOR: ICD-10-CM

## 2022-03-28 DIAGNOSIS — M81.0 OSTEOPOROSIS, UNSPECIFIED OSTEOPOROSIS TYPE, UNSPECIFIED PATHOLOGICAL FRACTURE PRESENCE: ICD-10-CM

## 2022-03-28 DIAGNOSIS — F41.0 PANIC DISORDER WITHOUT AGORAPHOBIA: ICD-10-CM

## 2022-03-28 DIAGNOSIS — M54.9 BACK PAIN, UNSPECIFIED BACK LOCATION, UNSPECIFIED BACK PAIN LATERALITY, UNSPECIFIED CHRONICITY: ICD-10-CM

## 2022-03-28 DIAGNOSIS — F33.2 MDD (MAJOR DEPRESSIVE DISORDER), RECURRENT SEVERE, WITHOUT PSYCHOSIS: Primary | ICD-10-CM

## 2022-03-28 DIAGNOSIS — T42.4X4S: ICD-10-CM

## 2022-03-28 DIAGNOSIS — F41.9 ANXIETY DISORDER, UNSPECIFIED TYPE: ICD-10-CM

## 2022-03-28 DIAGNOSIS — G47.00 INSOMNIA, UNSPECIFIED TYPE: ICD-10-CM

## 2022-03-28 PROCEDURE — 99214 OFFICE O/P EST MOD 30 MIN: CPT | Mod: AF,HB,95, | Performed by: PSYCHIATRY & NEUROLOGY

## 2022-03-28 PROCEDURE — 99214 PR OFFICE/OUTPT VISIT, EST, LEVL IV, 30-39 MIN: ICD-10-PCS | Mod: AF,HB,95, | Performed by: PSYCHIATRY & NEUROLOGY

## 2022-03-28 RX ORDER — LORAZEPAM 1 MG/1
1 TABLET ORAL 3 TIMES DAILY PRN
Qty: 90 TABLET | Refills: 2 | Status: SHIPPED | OUTPATIENT
Start: 2022-04-08 | End: 2022-06-13 | Stop reason: SDUPTHER

## 2022-03-28 RX ORDER — TRAZODONE HYDROCHLORIDE 100 MG/1
200-300 TABLET ORAL NIGHTLY PRN
Qty: 90 TABLET | Refills: 2 | Status: SHIPPED | OUTPATIENT
Start: 2022-03-28 | End: 2022-06-13 | Stop reason: SDUPTHER

## 2022-03-28 RX ORDER — ESCITALOPRAM OXALATE 20 MG/1
20 TABLET ORAL DAILY
Qty: 30 TABLET | Refills: 2 | Status: SHIPPED | OUTPATIENT
Start: 2022-03-28 | End: 2022-06-13 | Stop reason: SDUPTHER

## 2022-03-28 RX ORDER — ARIPIPRAZOLE 10 MG/1
10 TABLET ORAL DAILY
Qty: 30 TABLET | Refills: 2 | Status: SHIPPED | OUTPATIENT
Start: 2022-03-28 | End: 2022-06-13 | Stop reason: SDUPTHER

## 2022-03-28 RX ORDER — MIRTAZAPINE 45 MG/1
45 TABLET, FILM COATED ORAL NIGHTLY
Qty: 30 TABLET | Refills: 2 | Status: SHIPPED | OUTPATIENT
Start: 2022-03-28 | End: 2022-06-13 | Stop reason: SDUPTHER

## 2022-03-28 NOTE — PROGRESS NOTES
Gem Thomas   1965   03/28/2022      Disclaimer: Evaluation and treatment is based on information presented to date. Any new information may affect assessment and findings.     The patient location is: Patient's home/ [facility]    Encounter started off as video and audio However the audio connection  degraded and moved to finish on telephone    I also informed patient that I am  Kei Vizcarra MD and employed by   [facility] [place], [place]    If technology issues, call office phone: Ph: [phone] and I too will attempt to call her back    if crisis: Dial 911 or go to nearest Emergency Room (ER)  If questions related to privacy practices: contact [facility] Information Department: [phone]    Understanding Expressed. No questions.    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[place]    Note: Was AFTERCARE from [facility] IN[facility] March 2021 / overdose; says was not trying harm self     NOTE: Pt agrees to come into clinic for subsequent June 2022 appt    S: Patient's Own Perception of Condition (& Side Effects) : none / says sleeping well now     O:      CURRENT PRESENTATION:      Says doing ok / says likes meds     denies any safety concerns     Says had nerve block for lower back 3- in [facility]; says helped; says pain was up to 10 of 10 ; says now Mar 28 2022 : Pain  6 of 10; says she plans another block soon     Dad passed early Dec / mom is functional / lives with pt  in [place]; says sister checks on her as well    Dr Verma office (affiliated with [facility]) is PCP; says he expressed some interst in my psyc taking look at cymbalta as she has chronic pain.  As such we mutually agreed to Rx of Cymbalta 30 mg / remains with remeron 45 mg at bed; while Lexapro decreased to  10 mg    Yet (Mar 28 2022) she noted Cymbalta 30 mg did not seem to be doing much for her ; we discussed moving to  Cymbalta 60 mg or reverting to Lexapro 20 mg; she  express preference to move back to Lexapro 20 and D/C Cymbalta; such done    Says the regimen she has for sleep is helping good bit; feels better rested    Says Ativan 1 mg TID has  helped a lot    Stress: Back pain ; care for mother (dad passed De 2020)    Previously : sister present on TELEHEALTH  After that hospitalization ; Pt adamantly denied any S I/ no HI    We reviewed med regimen    She likes  ABILIFY     Goal directed polite calm     No psychosis    denies SI ; denies HI     Depression Patient Health Questionnaire 3/28/2022 2/7/2022 11/30/2021 8/3/2021 12/21/2020   Over the last two weeks how often have you been bothered by little interest or pleasure in doing things 2 2 1 1 2   Over the last two weeks how often have you been bothered by feeling down, depressed or hopeless 1 2 1 1 2   PHQ-2 Total Score 3 4 2 2 4   Over the last two weeks how often have you been bothered by trouble falling or staying asleep, or sleeping too much 0 0 0 0 1   Over the last two weeks how often have you been bothered by feeling tired or having little energy 1 2 1 1 2   Over the last two weeks how often have you been bothered by a poor appetite or overeating 0 2 0 0 0   Over the last two weeks how often have you been bothered by feeling bad about yourself - or that you are a failure or have let yourself or your family down 1 2 0 0 0   Over the last two weeks how often have you been bothered by trouble concentrating on things, such as reading the newspaper or watching television 0 1 1 1 2   Over the last two weeks how often have you been bothered by moving or speaking so slowly that other people could have noticed. Or the opposite - being so fidgety or restless that you have been moving around a lot more than usual. 1 2 1 1 1   Over the last two weeks how often have you been bothered by thoughts that you would be better off dead, or of hurting yourself 0 0 0 0 0   If you checked off any problems, how difficult have these problems  made it for you to do your work, take care of things at home or get along with other people? Not difficult at all Somewhat difficult Somewhat difficult Not difficult at all Somewhat difficult   Total Score 6 13 5 5 10   Interpretation Mild Moderate Mild Mild Moderate       GAD7 3/25/2022 2/5/2022 11/27/2021   1. Feeling nervous, anxious, or on edge? 1 1 1   2. Not being able to stop or control worrying? 1 1 1   3. Worrying too much about different things? 1 1 1   4. Trouble relaxing? 1 1 1   5. Being so restless that it is hard to sit still? 1 1 1   6. Becoming easily annoyed or irritable? 1 1 1   7. Feeling afraid as if something awful might happen? 0 1 1   ALEX-7 Score 6 7 7        Constitutional Health Concerns:      Review of Systems   Constitutional: Negative.    HENT: Negative.    Eyes: Negative.    Respiratory: Negative.    Cardiovascular: Negative.    Gastrointestinal:        Has had weight loss    Genitourinary: Negative.    Musculoskeletal:        Pain 6 of 10 ; had nerve block 3-25-22   Skin: Negative.    Neurological: Negative.    Endo/Heme/Allergies: Negative.         Laboratory Data  Component 02/18/22 11/01/21 04/09/21 03/11/21 03/04/21 11/06/20    Glucose Level 128 High  94 95 105 High  98 104 High    Blood Urea Nitrogen Level 19 21 17 18 16 20   Creatinine Level 0.96 0.76 0.78 0.80 0.92 0.81   GFR-CLAUDIA 67 89 77 75 71 83   GFR-AA 77  102  93 91 82 95   BUN/Creatinine Ratio 20 28 -- -- 17 25 High    Sodium Level 141 142 141 142 142 143   Potassium Level 4.0 4.7 3.9 3.8 4.1 3.6   Chloride Level 105 105 106 106 103 102   CO2 Level 23 24 28 27 26 26   Calcium Level 9.6 8.8 8.7 Low  9.0 9.9 9.8     Date/Time Component Value Flag Lab Status   11/01/21 1158 HGBA1C 5.6 -- Final result     CBC 2- [facility] lab    Specimen:  Blood - Venous structure (body structure)   Ref Range & Units 1 mo ago   White Blood Cell Count 3.4 - 10.8 x10E3/uL 7.8    Red Blood Cell Count 3.77 - 5.28 x10E6/uL  "4.38    Hemoglobin 11.1 - 15.9 g/dL 13.0    Hematocrit 34.0 - 46.6 % 40.1    Mean Corpuscular Volume 79 - 97 fL 92    MEAN CORPUSCULAR HEMOGLOBIN 26.6 - 33.0 pg 29.7    Mean Corpuscular Hemoglobin Conc 31.5 - 35.7 g/dL 32.4    Red Cell Distribution Width 11.7 - 15.4 % 12.3    Platelet Count 150 - 450 x10E3/uL 254    Neutrophils % Not Estab. % 79    Lymphocytes % Not Estab. % 16    Monocytes % Not Estab. % 4    Eosinophils % Not Estab. % 1    Basophils % Not Estab. % 0    Neutrophils Abs 1.4 - 7.0 x10E3/uL 6.2    Lymphocytes Abs 0.7 - 3.1 x10E3/uL 1.2    Monocytes Abs 0.1 - 0.9 x10E3/uL 0.3    Eosinophils Abs 0.0 - 0.4 x10E3/uL 0.0    Basophils Abs 0.0 - 0.2 x10E3/uL 0.0    Immature Granulocytes Not Estab. % 0    Immature Grans (Abs) 0.0 - 0.1 x10E3/uL 0.0    Resulting Agency  [facility]         Mental Status Exam:      Oriented: x 3   Attitude: cooperative     Mood: "ok"  Cognition: alert  Concentration: grossly intact     Anxiety: mild      Thought Process: goal directed     Speech:       Volume : WNL       Quantity WNL       Quality: appears to openly answer questions      Threats: no SI / no HI     Psychosis: denies all      Estimate of Intellectual Function: average   Impulse Control: no thoughts of harm to self/ others     Social: see admit psyc eval / has sister supportive / elderly mom lives with her; dad passed Dec 2021    Patient Active Problem List   Diagnosis    Depression, major, recurrent, moderate    Anxiety disorder    Panic disorder without agoraphobia    Pulmonary nodules    Neuroendocrine tumor    Depression    Atrophic gastritis without hemorrhage    Lacerations of multiple sites of left arm    Malnutrition of mild degree    MDD (major depressive disorder), recurrent severe, without psychosis    Overdose of benzodiazepine (2021), (says was trying to sleep DENIES any intent self harm ) admitted to [facility]    Osteoporosis    Back pain    Insomnia          Current Outpatient " "Medications:     ARIPiprazole (ABILIFY) 10 MG Tab, Take 1 tablet (10 mg total) by mouth once daily., Disp: 30 tablet, Rfl: 2    EScitalopram oxalate (LEXAPRO) 20 MG tablet, Take 1 tablet (20 mg total) by mouth once daily., Disp: 30 tablet, Rfl: 2    [START ON 4/8/2022] LORazepam (ATIVAN) 1 MG tablet, Take 1 tablet (1 mg total) by mouth 3 (three) times daily as needed (SIGNIFICANT ANXIETY or insomnia)., Disp: 90 tablet, Rfl: 2    mirtazapine (REMERON) 45 MG tablet, Take 1 tablet (45 mg total) by mouth every evening., Disp: 30 tablet, Rfl: 2    traZODone (DESYREL) 100 MG tablet, Take 2-3 tablets (200-300 mg total) by mouth nightly as needed for Insomnia., Disp: 90 tablet, Rfl: 2     Social History     Tobacco Use   Smoking Status Never Smoker   Smokeless Tobacco Never Used        Review of patient's allergies indicates:  No Known Allergies     ASSESSMENT:   Encounter Diagnoses   Name Primary?    MDD (major depressive disorder), recurrent moderate (to severe in past) , without psychosis Yes    Anxiety disorder, unspecified type     Panic disorder without agoraphobia     Overdose of benzodiazepine (2021) , (says was trying to sleep DENEIS any intent self harm ) admitted to [facility]     Insomnia, unspecified type     Back pain, thoracic and some lumbar; mailly R side says varies tho often gets to 10 of 10; sicne 8 months     Neuroendocrine tumor     Osteoporosis, unspecified osteoporosis type, (pt reports as of Aug 2021)      Patient Instructions     PLAN:     Requests Follow up  June 13 2022 @ 2 pm TELEHEALTH YET PATIENT  NEEDS TO CONFIRM APPOINTMENT  by seeing such appointment  appear on their "My Chart" dalila.  NOTE:  IF  appointment is not visible, THEN patient is  instructed to call  [phone] and coordinate appointment scheduling with . She will reschedule IF has other med appt conflicts tho will move to put this appt in place.    Pt agrees to do IN CLINIC APPT after the June 2022 " appt    Psychiatry Medication:   See after visit summary  (includes D/C Cymbalta and resume Lexapro 20 mg) ; see full med list for detail     References:     Relaxation stress reduction workbook: CATHERINE Toney PhD ( used: $7-10)    Feeling Good Website: Kei Bolanos MD / www.[employer] website (free) / eduardo. PODCASTS    Anxiety &  phobia workbook by ERIC Thompson PhD  (web retailers: used: $ 7-10)    [place]: Path to Better Sleep : https://www.veterantraining.[place].gov/insomnia/ (free)    [place] Quit with [place]: http://www.quitwithusla.org/    (and other resources as per counselor, if applicable)     Pt expressed appreciation for the visit today and did not have further question at this time though pt  was still informed to:     Call / Report Side Effects to Psyc MD     Encouraged to follow up with primary care / Gen Med MD for continued monitoring of general health and wellness.    Understanding was expressed; and no further concerns nor questions were raised at this time.     remember healthy self care:   eat right  attempt adequate rest   HANDWASHING / encourage such eduardo. During this corona virus time   walk or light exercise within reason and as your general med team approves  read or explore any of reference materials / homework mentioned  reach out (I.e.,  connect with)  others who nuture and bring out best in you  avoid risky behaviors  keep your appointments  IF you  cannot make your appt THEN please call or go online to reschedule.  avoid  alcohol and illicit substances.  Look for the positive.  All is often relative-seek balance  Call Behavioral Health Clinic  if questions : [phone]   Call 911 or go to Emergency Room  (ER)  if any acute concerns

## 2022-03-28 NOTE — PATIENT INSTRUCTIONS
"  PLAN:     Requests Follow up  June 13 2022 @ 2 pm TELEHEALTH YET PATIENT  NEEDS TO CONFIRM APPOINTMENT  by seeing such appointment  appear on their "My Chart" dalila.  NOTE:  IF  appointment is not visible, THEN patient is  instructed to call  [phone] and coordinate appointment scheduling with . She will reschedule IF has other med appt conflicts tho will move to put this appt in place.    Pt agrees to do IN CLINIC APPT after the June 2022 appt    Psychiatry Medication:   See after visit summary  (includes D/C Cymbalta and resume Lexapro 20 mg) ; see full med list for detail     References:     Relaxation stress reduction workbook: CATHERINE Toney PhD ( used: $7-10)    Feeling Good Website: Kei Bolanos MD / www.[employer] website (free) / eduardo. PODCASTS    Anxiety &  phobia workbook by ERIC Thompson PhD  (web retailers: used: $ 7-10)    [place]: Path to Better Sleep : https://www.veterantraining.[place].gov/insomnia/ (free)    [place]: http://www.quitwithusla.org/    (and other resources as per counselor, if applicable)     Pt expressed appreciation for the visit today and did not have further question at this time though pt  was still informed to:     Call / Report Side Effects to Psyc MD     Encouraged to follow up with primary care / Gen Med MD for continued monitoring of general health and wellness.    Understanding was expressed; and no further concerns nor questions were raised at this time.     remember healthy self care:   eat right  attempt adequate rest   HANDWASHING / encourage such eduardo. During this corona virus time   walk or light exercise within reason and as your general med team approves  read or explore any of reference materials / homework mentioned  reach out (I.e.,  connect with)  others who nuture and bring out best in you  avoid risky behaviors  keep your appointments  IF you  cannot make your appt THEN please call or go online to reschedule.  avoid  alcohol and illicit " substances.  Look for the positive.  All is often relative-seek balance  Call Behavioral Health Clinic  if questions : [phone]   Call 911 or go to Emergency Room  (ER)  if any acute concerns

## 2022-06-13 ENCOUNTER — TELEPHONE (OUTPATIENT)
Dept: PSYCHIATRY | Facility: CLINIC | Age: 57
End: 2022-06-13
Payer: MEDICAID

## 2022-06-13 ENCOUNTER — OFFICE VISIT (OUTPATIENT)
Dept: PSYCHIATRY | Facility: CLINIC | Age: 57
End: 2022-06-13
Payer: MEDICAID

## 2022-06-13 VITALS — WEIGHT: 104 LBS | HEIGHT: 62 IN | BODY MASS INDEX: 19.14 KG/M2

## 2022-06-13 DIAGNOSIS — D3A.8 NEUROENDOCRINE TUMOR: ICD-10-CM

## 2022-06-13 DIAGNOSIS — F41.9 ANXIETY DISORDER, UNSPECIFIED TYPE: ICD-10-CM

## 2022-06-13 DIAGNOSIS — G47.00 INSOMNIA, UNSPECIFIED TYPE: ICD-10-CM

## 2022-06-13 DIAGNOSIS — M54.9 BACK PAIN, UNSPECIFIED BACK LOCATION, UNSPECIFIED BACK PAIN LATERALITY, UNSPECIFIED CHRONICITY: ICD-10-CM

## 2022-06-13 DIAGNOSIS — F33.2 MDD (MAJOR DEPRESSIVE DISORDER), RECURRENT SEVERE, WITHOUT PSYCHOSIS: Primary | ICD-10-CM

## 2022-06-13 DIAGNOSIS — K29.40 ATROPHIC GASTRITIS WITHOUT HEMORRHAGE: ICD-10-CM

## 2022-06-13 DIAGNOSIS — R91.8 PULMONARY NODULES: ICD-10-CM

## 2022-06-13 DIAGNOSIS — T42.4X4S: ICD-10-CM

## 2022-06-13 DIAGNOSIS — M81.0 OSTEOPOROSIS, UNSPECIFIED OSTEOPOROSIS TYPE, UNSPECIFIED PATHOLOGICAL FRACTURE PRESENCE: ICD-10-CM

## 2022-06-13 DIAGNOSIS — F41.0 PANIC DISORDER WITHOUT AGORAPHOBIA: ICD-10-CM

## 2022-06-13 PROCEDURE — 3008F PR BODY MASS INDEX (BMI) DOCUMENTED: ICD-10-PCS | Mod: AF,HB,CPTII,95 | Performed by: PSYCHIATRY & NEUROLOGY

## 2022-06-13 PROCEDURE — 99215 OFFICE O/P EST HI 40 MIN: CPT | Mod: AF,HB,95, | Performed by: PSYCHIATRY & NEUROLOGY

## 2022-06-13 PROCEDURE — 3008F BODY MASS INDEX DOCD: CPT | Mod: AF,HB,CPTII,95 | Performed by: PSYCHIATRY & NEUROLOGY

## 2022-06-13 PROCEDURE — 99215 PR OFFICE/OUTPT VISIT, EST, LEVL V, 40-54 MIN: ICD-10-PCS | Mod: AF,HB,95, | Performed by: PSYCHIATRY & NEUROLOGY

## 2022-06-13 RX ORDER — MIRTAZAPINE 45 MG/1
45 TABLET, FILM COATED ORAL NIGHTLY
Qty: 30 TABLET | Refills: 2 | Status: SHIPPED | OUTPATIENT
Start: 2022-06-13 | End: 2022-07-25

## 2022-06-13 RX ORDER — LORAZEPAM 1 MG/1
1 TABLET ORAL 3 TIMES DAILY PRN
Qty: 90 TABLET | Refills: 2 | Status: SHIPPED | OUTPATIENT
Start: 2022-07-01 | End: 2022-07-25

## 2022-06-13 RX ORDER — TRAZODONE HYDROCHLORIDE 100 MG/1
200-300 TABLET ORAL NIGHTLY PRN
Qty: 90 TABLET | Refills: 2 | Status: SHIPPED | OUTPATIENT
Start: 2022-06-13 | End: 2022-09-11

## 2022-06-13 RX ORDER — ARIPIPRAZOLE 10 MG/1
10 TABLET ORAL DAILY
Qty: 30 TABLET | Refills: 2 | Status: SHIPPED | OUTPATIENT
Start: 2022-06-13 | End: 2022-07-25 | Stop reason: SDUPTHER

## 2022-06-13 RX ORDER — ESCITALOPRAM OXALATE 20 MG/1
20 TABLET ORAL DAILY
Qty: 30 TABLET | Refills: 2 | Status: SHIPPED | OUTPATIENT
Start: 2022-06-13 | End: 2022-07-25 | Stop reason: SDUPTHER

## 2022-06-13 NOTE — PROGRESS NOTES
Gem Thomas   1965   06/13/2022      Disclaimer: Evaluation and treatment is based on information presented to date. Any new information may affect assessment and findings.     The patient location is: Patient's home in [place], [place]    Who: pt and sister  (konstantin Merino) / off camera     Visit type: Virtual visit with synchronous audio and video     Time 50 min / pain mgt / med mngt / eating issues (nutrition / et mngt / consult placed) sister participated in session ; self rating scales administered / chart review  Re: oncology notes incl of GINI GOLDBERG May 2021    Each patient to whom he or she provides medical services by telemedicine is: (1) informed of the relationship between the physician and patient and the respective role of any other health care provider with respect to management of the patient; and (2) notified that he or she may decline to receive medical services by telemedicine and may withdraw from such care at any time.    Patient was informed that I am a physician who is licensed in the [facility]:  Kei Vizcarra MD:  Employed by   [facility] [employer] [facility]     If technology issues arise: TOYA Vizcarra MD will attempt to call pt back tho pt also instructed that he/she may  call our office phone at: [phone]    Pt informed that if he / she is ever in crisis (or has acute concerns): pt is instructed to Dial 911 or go to nearest Emergency Room (ER)    Pt informed that if questions related to privacy practices: pt is instructed to contact [facility] Information Department: [phone]    Understanding Expressed. No questions.    Note: Was AFTERCARE from [facility] IN[facility] March 2021 / overdose; says was not trying harm self     History Neuroendocrine Tumor    In past has worked as Mental Health tech in [place]    S: Patient's Own Perception of Condition (& Side Effects) : none / says sleeping well now     O:      CURRENT PRESENTATION:      denies any safety  "concerns     When asked reports wt down to 104 lb /     Says had nerve block for lower back  in [facility]; last session 3- (Pain  6 of 10) said: such  says helped; pain had been up to 10 of 10 ; says today that improvemnt was short lived. Says pain remain about 8 of 10    Dad passed early Dec / mom is functional / lives with pt  in [place]; says sister checks on her as well    Dr Verma office (affiliated with [facility]) is PCP; says he expressed some interst in my psyc taking look at cymbalta  treid such  Yet (Mar 28 2022) she noted Cymbalta 30 mg did not seem to be doing much for her ; we discussed moving to  Cymbalta 60 mg or reverting to Lexapro 20 mg; she express preference to move back to Lexapro 20 and D/C Cymbalta; such done    Has reported Ativan 1 mg TID  Has helped good bit    Stress: Back pain ; care for mother (dad passed Dec 2020)    Tends to say little     Does not mention interest / activities / interactions with others     We reviewed med regimen    She likes  ABILIFY     Goal directed calm / says little / answers questions asked    No psychosis    denies SI ; denies HI     PAIN: 8 of 10 / goes  [facility] / had ablation March 2022      Note: of Stephanie Verma MD [facility] Jan 2021   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 Oncology recommended repeat endoscopy to be done about a month ago. She has not had this done. She has not seen GI.        Report wt 104 lb / Ht 5'2" (BMI 19.02  6-13-22); consult placed Nutrition services     Depression Patient Health Questionnaire 6/13/2022 3/28/2022 2/7/2022 11/30/2021 8/3/2021 12/21/2020   Over the last two weeks how often have you been bothered by little interest or pleasure in doing things 2 2 2 1 1 2   Over the last two weeks how often have you been bothered by feeling down, depressed or hopeless 2 1 2 1 1 2   PHQ-2 Total Score 4 3 4 2 2 4   Over the last two weeks how often have you been bothered by trouble falling " or staying asleep, or sleeping too much 3 0 0 0 0 1   Over the last two weeks how often have you been bothered by feeling tired or having little energy 3 1 2 1 1 2   Over the last two weeks how often have you been bothered by a poor appetite or overeating 0 0 2 0 0 0   Over the last two weeks how often have you been bothered by feeling bad about yourself - or that you are a failure or have let yourself or your family down 1 1 2 0 0 0   Over the last two weeks how often have you been bothered by trouble concentrating on things, such as reading the newspaper or watching television 0 0 1 1 1 2   Over the last two weeks how often have you been bothered by moving or speaking so slowly that other people could have noticed. Or the opposite - being so fidgety or restless that you have been moving around a lot more than usual. 1 1 2 1 1 1   Over the last two weeks how often have you been bothered by thoughts that you would be better off dead, or of hurting yourself 0 0 0 0 0 0   If you checked off any problems, how difficult have these problems made it for you to do your work, take care of things at home or get along with other people? Somewhat difficult Not difficult at all Somewhat difficult Somewhat difficult Not difficult at all Somewhat difficult   Total Score 12 6 13 5 5 10   Interpretation Moderate Mild Moderate Mild Mild Moderate       GAD7 6/10/2022 3/25/2022 2/5/2022   1. Feeling nervous, anxious, or on edge? 1 1 1   2. Not being able to stop or control worrying? 1 1 1   3. Worrying too much about different things? 1 1 1   4. Trouble relaxing? 1 1 1   5. Being so restless that it is hard to sit still? 1 1 1   6. Becoming easily annoyed or irritable? 1 1 1   7. Feeling afraid as if something awful might happen? 1 0 1   ALEX-7 Score 7 6 7        Constitutional Health Concerns:      Review of Systems   Constitutional: Negative.    HENT: Negative.    Eyes: Negative.    Respiratory: Negative.    Cardiovascular: Negative.     Gastrointestinal:        Has had weight loss    Genitourinary: Negative.    Musculoskeletal:        Pain 6 of 10 ; had nerve block 3-25-22   Skin: Negative.    Neurological: Negative.    Endo/Heme/Allergies: Negative.    (06/06/2022 5:36 PM CDT)  Performing Organization Address City/State/ZIP Code Phone Number   [address]              Back to top of Results     from [facility]    (ABNORMAL) Basic metabolic panel (06/01/2022 7:47 AM CDT)  Results - (ABNORMAL) Basic metabolic panel (06/01/2022 7:47 AM CDT)  Component Value Ref Range Test Method Analysis Time Performed At Pathologist Signature   Glucose Level 104 (H) 65 - 99 mg/dL     [facility]     Blood Urea Nitrogen Level 18 6 - 24 mg/dL     [facility]     Creatinine Level 0.82 0.57 - 1.00 mg/dL     [facility]     EGFR 84 >59 mL/min/1.73     [facility]     BUN/Creatinine Ratio 22 9 - 23     [facility]     Sodium Level 144 134 - 144 mmol/L     [facility]     Potassium Level 4.0 3.5 - 5.2 mmol/L     [facility]     Chloride Level 107 (H) 96 - 106 mmol/L     [facility]     CO2 Level 25 20 - 29 mmol/L     [facility]     Calcium Level 9.3 8.7 - 10.2 mg/dL     [facility]       Results - (ABNORMAL) Basic metabolic panel (06/01/2022 7:47 AM CDT)  Specimen (Source) Anatomical Location / Laterality Collection Method / Volume Collection Time Received Time   Blood VENOUS STRUCTURE / Unknown   06/01/2022 7:47 AM CDT 06/01/2022          Laboratory Data  Component 02/18/22 11/01/21 04/09/21 03/11/21 03/04/21 11/06/20    Glucose Level 128 High  94 95 105 High  98 104 High    Blood Urea Nitrogen Level 19 21 17 18 16 20   Creatinine Level 0.96 0.76 0.78 0.80 0.92 0.81   GFR-CLAUDIA 67 89 77 75 71 83   GFR-AA 77  102  93 91 82 95   BUN/Creatinine Ratio 20 28 -- -- 17 25 High    Sodium Level 141 142 141 142 142 143   Potassium Level 4.0 4.7 3.9 3.8 4.1 3.6   Chloride Level 105 105 106 106 103 102   CO2 Level 23 24 28 27 26 26   Calcium Level 9.6 8.8 8.7 Low  9.0 9.9 9.8  "    Date/Time Component Value Flag Lab Status   11/01/21 1158 HGBA1C 5.6 -- Final result     CBC 2- [facility] lab    Specimen:  Blood - Venous structure (body structure)   Ref Range & Units 1 mo ago   White Blood Cell Count 3.4 - 10.8 x10E3/uL 7.8    Red Blood Cell Count 3.77 - 5.28 x10E6/uL 4.38    Hemoglobin 11.1 - 15.9 g/dL 13.0    Hematocrit 34.0 - 46.6 % 40.1    Mean Corpuscular Volume 79 - 97 fL 92    MEAN CORPUSCULAR HEMOGLOBIN 26.6 - 33.0 pg 29.7    Mean Corpuscular Hemoglobin Conc 31.5 - 35.7 g/dL 32.4    Red Cell Distribution Width 11.7 - 15.4 % 12.3    Platelet Count 150 - 450 x10E3/uL 254    Neutrophils % Not Estab. % 79    Lymphocytes % Not Estab. % 16    Monocytes % Not Estab. % 4    Eosinophils % Not Estab. % 1    Basophils % Not Estab. % 0    Neutrophils Abs 1.4 - 7.0 x10E3/uL 6.2    Lymphocytes Abs 0.7 - 3.1 x10E3/uL 1.2    Monocytes Abs 0.1 - 0.9 x10E3/uL 0.3    Eosinophils Abs 0.0 - 0.4 x10E3/uL 0.0    Basophils Abs 0.0 - 0.2 x10E3/uL 0.0    Immature Granulocytes Not Estab. % 0    Immature Grans (Abs) 0.0 - 0.1 x10E3/uL 0.0    Resulting Agency  [facility]         Mental Status Exam:      Oriented: x 3   Attitude: cooperative     Mood: "ok"  Cognition: alert  Concentration: grossly intact     Affect: Shallow    Anxiety: mild      Thought Process: goal directed   Content:     Speech:       Volume : WNL       Quantity WNL       Quality: appears to openly answer questions      Threats: no SI / no HI     Psychosis: denies all      Estimate of Intellectual Function: average   Impulse Control: no thoughts of harm to self/ others     Social: see admit psyc eval / has sister supportive / elderly mom lives with her; dad passed Dec 2021; says little of interests / involvement with others    Patient Active Problem List   Diagnosis    Depression, major, recurrent, moderate    Anxiety disorder    Panic disorder without agoraphobia    Pulmonary nodules    Neuroendocrine tumor    Depression " "   Atrophic gastritis without hemorrhage    Lacerations of multiple sites of left arm    Malnutrition of mild degree    MDD (major depressive disorder), recurrent severe, without psychosis    Overdose of benzodiazepine (2021), (says was trying to sleep DENIES any intent self harm ) admitted to [facility]    Osteoporosis    Back pain    Insomnia          Current Outpatient Medications:     ARIPiprazole (ABILIFY) 10 MG Tab, Take 1 tablet (10 mg total) by mouth once daily., Disp: 30 tablet, Rfl: 2    EScitalopram oxalate (LEXAPRO) 20 MG tablet, Take 1 tablet (20 mg total) by mouth once daily., Disp: 30 tablet, Rfl: 2    [START ON 7/1/2022] LORazepam (ATIVAN) 1 MG tablet, Take 1 tablet (1 mg total) by mouth 3 (three) times daily as needed (SIGNIFICANT ANXIETY or insomnia)., Disp: 90 tablet, Rfl: 2    mirtazapine (REMERON) 45 MG tablet, Take 1 tablet (45 mg total) by mouth every evening., Disp: 30 tablet, Rfl: 2    traZODone (DESYREL) 100 MG tablet, Take 2-3 tablets (200-300 mg total) by mouth nightly as needed for Insomnia., Disp: 90 tablet, Rfl: 2     Social History     Tobacco Use   Smoking Status Never Smoker   Smokeless Tobacco Never Used        Review of patient's allergies indicates:  No Known Allergies     ASSESSMENT:   Encounter Diagnoses   Name Primary?    MDD (major depressive disorder), recurrent moderate (to severe in past) , without psychosis Yes    Anxiety disorder, unspecified type     Insomnia, unspecified type     Body mass index (BMI) 19.9 or less, adult (reports wt 104 lb 5'2" / June 13 2022     Overdose of benzodiazepine (2021) , (says was trying to sleep DENEIS any intent self harm ) admitted to [facility]     Neuroendocrine tumor     Pulmonary nodules     Osteoporosis, unspecified osteoporosis type, (pt reports as of Aug 2021)     Atrophic gastritis without hemorrhage     Back pain, thoracic and some lumbar; mailly R side says varies tho often gets to 10 of " 10; sicne 8 months     Panic disorder without agoraphobia      Risk Assessment done: submitting for scanning / in my opinion / low at present / aware of social work: declines ; informed  aware 911 and ER if acute concerns     Patient Instructions     PLAN:     Follow up:    8-15-22 @1:30p IN CLINIC      Psychiatry Medication:   See after visit summary  (includes D/C Cymbalta and resume Lexapro 20 mg) ; see full med list for detail     Recommended she have follow up with Berna lassiter on frequency that works for her yet she declines  / yet says she aware she can re request    Consult placed for Nutrition services as wt 104 lb     Called as writing note up: left message: reminder follow up GI appt and  follow up endoscopy as per oncology       References:     Relaxation stress reduction workbook: CATHERINE Toney PhD ( used: $7-10)    Feeling Good Website: Kei Bolanos MD / www.[email or web address] website (free) / eduardo. PODCASTS    Anxiety &  phobia workbook by ERIC Thompson PhD  (web retailers: used: $ 7-10)    [place]: Path to Better Sleep : https://www.[place]eter[place]raining.[place]insomnia/ (free)    [place]: http://www.quitwithus[place].org/    (and other resources as per counselor, if applicable)     Pt expressed appreciation for the visit today and did not have further question at this time though pt  was still informed to:     Call / Report Side Effects to Psyc MD     Encouraged to follow up with primary care / Gen Med MD for continued monitoring of general health and wellness.    Understanding was expressed; and no further concerns nor questions were raised at this time.     remember healthy self care:   eat right  attempt adequate rest   HANDWASHING / encourage such eduardo. During this corona virus time   walk or light exercise within reason and as your general med team approves  read or explore any of reference materials / homework mentioned  reach out (I.e.,  connect with)  others who nuture and bring out best in  you  avoid risky behaviors  keep your appointments  IF you  cannot make your appt THEN please call or go online to reschedule.  avoid  alcohol and illicit substances.  Look for the positive.  All is often relative-seek balance  Call [facility]al [facility] Clinic  if questions : [phone]   Call 911 or go to Emergency Room  (ER)  if any acute concerns        Information below is FOR BACKGROUND / REFERENCE / ONCOLOGY NOTE May 2021:    Hitesh Mary MD (Attending)    HITESH MARY MD  [facility] MEDICAL ONCOLOGY  [address]  [address]  [place] [address]  Dept: [phone]     55 yo female referred for gastric carcinoid. Has lost weight over the last two years. Has autoimmune gastritis. Has loose stools frequently. No dietary changes. Was having left lower quadrant pain. Still happens occasionally. Had endoscopy in [place] in June. This showed chronic gastritis without tumor. Had an adenoma in duodenum. Has back pain as well. Upper right back pain. Has a mmg scheduled. She had a follow-up endoscopic ultrasound on 12/9/2020 which multiple biopsies were taken all negative for neuroendocrine cancer. They did show chronic gastritis. There was no tumor noted. She had an octreotide scan which was negative as well.    Pathology:  · 7/27/2020 thyroid left FNA benign follicular nodule  · 6/4/2020 gastric polyp-polypoid gastric mucosa with active chronic gastritis is present negative for H. pylori. No evidence of an adenoma. Small intestine normal. Gastric body chronic gastritis.  · 11/19/2020 duodenum no significant findings. Stomach well-differentiated neuroendocrine tumor 0.3 cm well-differentiated carcinoid. Less than 2 out of 10 high-powered fields mitotic rate. No lymphovascular perineural invasion. H. pylori negative. Random colon normal. Terminal ileum normal.  · 12/9/2020 stomach incisura chronic gastritis, antrum gastritis intestinal metaplasia, stomach chronic gastritis,  stomach chronic gastritis, stomach fundus, chronic gastritis, stomach cardia chronic gastritis with intestinal metaplasia. Findings suggestive of autoimmune gastritis. No evidence of neuroendocrine neoplasm.     Radiology Studies:  · 3/12/2020 mammogram benign  · 2/11/2021 octreotide scan negative  · 4/24/2020 CT chest abdomen pelvis-subcentimeter bilateral thyroid lobe nodules. 2 mm right middle lobe 3 mm right middle lobe. Abdomen pancreas normal stomach mild thickening.  · 7/31/2020 capsule endoscopy-unremarkable  · 3/12/2020 small bowel series normal  · 1/28/2020 pelvic ultrasound normal  · 1/28/2020 ultrasound thyroid stable thyroid nodules dominant left nodule has mildly suspicious features.  · 4/29/2021 CT CAP-no evidence of malignancy     Assessment/Plan:  1. Gastric carcinoid type I - recommend she have follow-up endoscopy at the end of this year. She will continue her B12 replacement. I will follow-up with her in a year.

## 2022-06-14 NOTE — TELEPHONE ENCOUNTER
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 Oncology recommended repeat endoscopy to be done about a month ago. She has not had this done. She has not seen GI.    Comment: such is in contect of gastric tmor and some wt loss     D Post Psyc MD

## 2022-06-14 NOTE — PATIENT INSTRUCTIONS
PLAN:     Follow up:    8-15-22 @1:30p IN CLINIC      Psychiatry Medication:   See after visit summary  (includes D/C Cymbalta and resume Lexapro 20 mg) ; see full med list for detail     Recommended she have follow up with Berna lassiter on frequency that works for her yet she declines  / yet says she aware she can re request    Consult placed for Nutrition services as wt 104 lb     Called as writing note up: left message: reminder follow up GI appt and  follow up endoscopy as per oncology       References:     Relaxation stress reduction workbook: CATHERINE Toney PhD ( used: $7-10)    Feeling Good Website: Kei Bolanos MD / www.[employer] website (free) / eduardo. PODCASTS    Anxiety &  phobia workbook by ERIC Thompson PhD  (web retailers: used: $ 7-10)    [place]: Path to Better Sleep : https://www.veterantraining.[place]/insomnia/ (free)    [place]: http://www.quitwithusla.org/    (and other resources as per counselor, if applicable)     Pt expressed appreciation for the visit today and did not have further question at this time though pt  was still informed to:     Call / Report Side Effects to Psyc MD     Encouraged to follow up with primary care / Gen Med MD for continued monitoring of general health and wellness.    Understanding was expressed; and no further concerns nor questions were raised at this time.     remember healthy self care:   eat right  attempt adequate rest   HANDWASHING / encourage such eduardo. During this corona virus time   walk or light exercise within reason and as your general med team approves  read or explore any of reference materials / homework mentioned  reach out (I.e.,  connect with)  others who nuture and bring out best in you  avoid risky behaviors  keep your appointments  IF you  cannot make your appt THEN please call or go online to reschedule.  avoid  alcohol and illicit substances.  Look for the positive.  All is often relative-seek balance  Call Behavioral  Health Clinic  if questions : [phone]   Call 911 or go to Emergency Room  (ER)  if any acute concerns

## 2022-07-08 ENCOUNTER — PATIENT MESSAGE (OUTPATIENT)
Dept: PSYCHIATRY | Facility: CLINIC | Age: 57
End: 2022-07-08
Payer: MEDICAID

## 2022-07-22 ENCOUNTER — TELEPHONE (OUTPATIENT)
Dept: PSYCHIATRY | Facility: CLINIC | Age: 57
End: 2022-07-22
Payer: MEDICAID

## 2022-07-25 ENCOUNTER — OFFICE VISIT (OUTPATIENT)
Dept: PSYCHIATRY | Facility: CLINIC | Age: 57
End: 2022-07-25
Payer: MEDICAID

## 2022-07-25 VITALS
BODY MASS INDEX: 18.01 KG/M2 | WEIGHT: 97.88 LBS | DIASTOLIC BLOOD PRESSURE: 106 MMHG | SYSTOLIC BLOOD PRESSURE: 144 MMHG | HEIGHT: 62 IN | HEART RATE: 124 BPM

## 2022-07-25 DIAGNOSIS — G47.00 INSOMNIA, UNSPECIFIED TYPE: ICD-10-CM

## 2022-07-25 DIAGNOSIS — T42.4X4S: ICD-10-CM

## 2022-07-25 DIAGNOSIS — K29.40 ATROPHIC GASTRITIS WITHOUT HEMORRHAGE: ICD-10-CM

## 2022-07-25 DIAGNOSIS — F33.2 MDD (MAJOR DEPRESSIVE DISORDER), RECURRENT SEVERE, WITHOUT PSYCHOSIS: Primary | ICD-10-CM

## 2022-07-25 DIAGNOSIS — D3A.8 NEUROENDOCRINE TUMOR: ICD-10-CM

## 2022-07-25 DIAGNOSIS — F43.29 PROLONGED GRIEF REACTION: ICD-10-CM

## 2022-07-25 DIAGNOSIS — F41.9 ANXIETY DISORDER, UNSPECIFIED TYPE: ICD-10-CM

## 2022-07-25 PROCEDURE — 3008F BODY MASS INDEX DOCD: CPT | Mod: AF,HB,CPTII, | Performed by: PSYCHIATRY & NEUROLOGY

## 2022-07-25 PROCEDURE — 99215 PR OFFICE/OUTPT VISIT, EST, LEVL V, 40-54 MIN: ICD-10-PCS | Mod: S$PBB,AF,HB, | Performed by: PSYCHIATRY & NEUROLOGY

## 2022-07-25 PROCEDURE — 3080F PR MOST RECENT DIASTOLIC BLOOD PRESSURE >= 90 MM HG: ICD-10-PCS | Mod: AF,HB,CPTII, | Performed by: PSYCHIATRY & NEUROLOGY

## 2022-07-25 PROCEDURE — 3080F DIAST BP >= 90 MM HG: CPT | Mod: AF,HB,CPTII, | Performed by: PSYCHIATRY & NEUROLOGY

## 2022-07-25 PROCEDURE — 99215 OFFICE O/P EST HI 40 MIN: CPT | Mod: S$PBB,AF,HB, | Performed by: PSYCHIATRY & NEUROLOGY

## 2022-07-25 PROCEDURE — 3077F SYST BP >= 140 MM HG: CPT | Mod: AF,HB,CPTII, | Performed by: PSYCHIATRY & NEUROLOGY

## 2022-07-25 PROCEDURE — 1160F PR REVIEW ALL MEDS BY PRESCRIBER/CLIN PHARMACIST DOCUMENTED: ICD-10-PCS | Mod: AF,HB,CPTII, | Performed by: PSYCHIATRY & NEUROLOGY

## 2022-07-25 PROCEDURE — 1159F PR MEDICATION LIST DOCUMENTED IN MEDICAL RECORD: ICD-10-PCS | Mod: AF,HB,CPTII, | Performed by: PSYCHIATRY & NEUROLOGY

## 2022-07-25 PROCEDURE — 99212 OFFICE O/P EST SF 10 MIN: CPT | Mod: PBBFAC | Performed by: PSYCHIATRY & NEUROLOGY

## 2022-07-25 PROCEDURE — 3077F PR MOST RECENT SYSTOLIC BLOOD PRESSURE >= 140 MM HG: ICD-10-PCS | Mod: AF,HB,CPTII, | Performed by: PSYCHIATRY & NEUROLOGY

## 2022-07-25 PROCEDURE — 99999 PR PBB SHADOW E&M-EST. PATIENT-LVL II: CPT | Mod: PBBFAC,AF,HB, | Performed by: PSYCHIATRY & NEUROLOGY

## 2022-07-25 PROCEDURE — 1160F RVW MEDS BY RX/DR IN RCRD: CPT | Mod: AF,HB,CPTII, | Performed by: PSYCHIATRY & NEUROLOGY

## 2022-07-25 PROCEDURE — 1159F MED LIST DOCD IN RCRD: CPT | Mod: AF,HB,CPTII, | Performed by: PSYCHIATRY & NEUROLOGY

## 2022-07-25 PROCEDURE — 99999 PR PBB SHADOW E&M-EST. PATIENT-LVL II: ICD-10-PCS | Mod: PBBFAC,AF,HB, | Performed by: PSYCHIATRY & NEUROLOGY

## 2022-07-25 PROCEDURE — 3008F PR BODY MASS INDEX (BMI) DOCUMENTED: ICD-10-PCS | Mod: AF,HB,CPTII, | Performed by: PSYCHIATRY & NEUROLOGY

## 2022-07-25 RX ORDER — HYDROXYZINE PAMOATE 25 MG/1
25 CAPSULE ORAL 3 TIMES DAILY PRN
Qty: 90 CAPSULE | Refills: 2 | Status: SHIPPED | OUTPATIENT
Start: 2022-07-25 | End: 2022-10-23

## 2022-07-25 RX ORDER — MIRTAZAPINE 7.5 MG/1
7.5 TABLET, FILM COATED ORAL NIGHTLY
COMMUNITY
Start: 2022-07-24 | End: 2022-07-25

## 2022-07-25 RX ORDER — HYDROXYZINE PAMOATE 25 MG/1
25 CAPSULE ORAL 3 TIMES DAILY PRN
COMMUNITY
Start: 2022-07-23 | End: 2022-07-25 | Stop reason: SDUPTHER

## 2022-07-25 RX ORDER — DIPHENHYDRAMINE HCL 25 MG/1
50 TABLET ORAL NIGHTLY
COMMUNITY
Start: 2022-07-22

## 2022-07-25 RX ORDER — MIRTAZAPINE 7.5 MG/1
7.5 TABLET, FILM COATED ORAL NIGHTLY
Qty: 30 TABLET | Refills: 11 | Status: SHIPPED | OUTPATIENT
Start: 2022-07-25 | End: 2023-07-25

## 2022-07-25 RX ORDER — ARIPIPRAZOLE 10 MG/1
10 TABLET ORAL DAILY
Qty: 30 TABLET | Refills: 2 | Status: SHIPPED | OUTPATIENT
Start: 2022-07-25 | End: 2022-10-23

## 2022-07-25 RX ORDER — ESCITALOPRAM OXALATE 20 MG/1
20 TABLET ORAL DAILY
Qty: 30 TABLET | Refills: 2 | Status: SHIPPED | OUTPATIENT
Start: 2022-07-25 | End: 2022-10-23

## 2022-07-25 NOTE — PATIENT INSTRUCTIONS
4-6 weeks narquetta to find slot    No knive no wespon  ECT info to [facility]    Counselot ms libradoo n    Meds renew if issue let us know  Understanding Expressed      PLAN:     Follow up:    Agrees to social work / after now overdose / did not find good fit with last social work / did not return to [facility] LCSW / agrees to change: med asst with set up with [facility] [place] [facility] LCSW    8/24/2022  4:30 PM ESTABLISHED PATIENT - [facility] - Psychiatry Kei Vizcarra MD      Location Instructions:    3rd Floor          9/12/2022 11:00 AM NEW PATIENT - [facility] - Psychiatry [place] ATKINS Magdiel    Location Instructions:    [place] [address]     Psychiatry Medication:   See after visit summary       References:     Relaxation stress reduction workbook: CATHERINE Toney PhD ( used: $7-10)    Feeling Good Website: Kei Bolanos MD / www.[employer] website (free) / eduardo. PODCASTS    Anxiety &  phobia workbook by ERIC Thompson PhD  (web retailers: used: $ 7-10)    [place]: Path to Better Sleep : https://www.[place]eter[place]raining.[place]/insomnia/ (free)    [place]: http://www.quitwithusla.org/    (and other resources as per counselor, if applicable)     Pt expressed appreciation for the visit today and did not have further question at this time though pt  was still informed to:     Call / Report Side Effects to Psyc MD     Encouraged to follow up with primary care / Gen Med MD for continued monitoring of general health and wellness.    Understanding was expressed; and no further concerns nor questions were raised at this time.     remember healthy self care:   eat right  attempt adequate rest   HANDWASHING / encourage such eduardo. During this corona virus time   walk or light exercise within reason and as your general med team approves  read or explore any of reference materials / homework mentioned  reach out (I.e.,  connect with)  others who nuture and bring out best in you  avoid risky behaviors  keep your  appointments  IF you  cannot make your appt THEN please call or go online to reschedule.  avoid  alcohol and illicit substances.  Look for the positive.  All is often relative-seek balance  Call Behavioral Health Clinic  if questions : [phone]   Call 911 or go to Emergency Room  (ER)  if any acute concerns

## 2022-07-25 NOTE — PROGRESS NOTES
Gem Thomas   1965   07/25/2022      Disclaimer: Evaluation and treatment is based on information presented to date. Any new information may affect assessment and findings.     The patient location is: IN CLINIC    Who: pt mother and brother (who came to [place] from [facility])    Visit type: IN CLINIC     Time 50 min  AFTERCARE from [facility] / she took Overdose on Ativan this was 2nd overdose; family took to  [facility] and was placed at [facility] . Says was there 6 days /     NO DISCHARGE materials accompany    She also returned to ER 7- / seen again in ER  and released from [facility]    No longer on any benzo / such D/C'd    Says she on Remeron 7.5 mg  / Lexapro 20 mg and Abilify 10 mg ; tho I told her to check her home meds.     I have also asked Med Asst to get released signed and contact [facility] for D/C med list, D/C summary , and Psyc Eval     Understanding Expressed. No questions.    Note: Was previously AFTERCARE from [facility] March 2021 / overdose; at that time said was not trying harm self     History Neuroendocrine Tumor    In past has worked as Mental Health tech in [place]    S: Patient's Own Perception of Condition (& Side Effects) : none / says sleeping well now     O:      CURRENT PRESENTATION:      denies any safety concerns     Weight 97 lb    Has struggled with chronic pain as well     Says had nerve block for lower back  in [facility]; last session 3- (Pain  6 of 10) said: such  says helped; pain had been up to 10 of 10 ; says today that improvemnt was short lived. Says pain remain about 8 of 10    Dad passed early Dec / mom is functional / lives with pt  in [place]; says sister checks on her as well    Dr Bayron buckley (affiliated with [facility]) is PCP; says he expressed some interst in my psyc taking look at cymbalta  christina such  Yet (Mar 28 2022) she noted Cymbalta 30 mg did not seem to be doing much for  "her ; we discussed moving to  Cymbalta 60 mg or reverting to Lexapro 20 mg; she express preference to move back to Lexapro 20 and D/C Cymbalta; such done    Historically had reported  Ativan 1 mg TID  Helped tho now 2nd overdose     Tends to say little     Does not mention interest / activities / interactions with others     We reviewed med regimen    She likes  ABILIFY     Goal directed calm / says little / answers questions asked    No psychosis    denies SI ; denies HI     Note: of Stephanie Verma MD [facility] Jan 2021   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 Oncology recommended repeat endoscopy to be done about a month ago. She has not had this done. She has not seen GI.    Report wt 104 lb / Ht 5'2" (BMI 19.02  6-13-22); consult placed Nutrition services     Constitutional Health Concerns:      Review of Systems   Constitutional: Negative.    HENT: Negative.    Eyes: Negative.    Respiratory: Negative.    Cardiovascular: Negative.    Gastrointestinal:        Has had weight loss    Genitourinary: Negative.    Musculoskeletal: mild tremor R hand   Skin: Negative.    Neurological: Negative.    Endo/Heme/Allergies: Negative.      Mental Status Exam:      Oriented: x 3   Attitude: reserved     Mood: says doing bit better   Cognition: alert  Concentration: grossly intact     Affect: Shallow    Anxiety: mild      Thought Process: goal directed   Content:     Speech:       Volume : WNL       Quantity WNL       Quality: appears to openly answer questions      Threats: no SI / no HI     Psychosis: denies all      Estimate of Intellectual Function: average   Impulse Control: no thoughts of harm to self/ others     Social: see admit psyc eval / has sister supportive / elderly mom lives with her; dad passed Dec 2021; says little of interests / involvement with others    Patient Active Problem List   Diagnosis    Depression, major, recurrent, moderate    Anxiety disorder    Panic disorder without " agoraphobia    Pulmonary nodules    Neuroendocrine tumor    Depression    Atrophic gastritis without hemorrhage    Lacerations of multiple sites of left arm    Malnutrition of mild degree    MDD (major depressive disorder), recurrent severe, without psychosis    Overdose of benzodiazepine (2021), (says was trying to sleep DENIES any intent self harm ) admitted to [facility]    Osteoporosis    Back pain    Insomnia          Current Outpatient Medications:     ARIPiprazole (ABILIFY) 10 MG Tab, Take 1 tablet (10 mg total) by mouth once daily., Disp: 30 tablet, Rfl: 2    BANOPHEN 25 mg tablet, Take 50 mg by mouth nightly., Disp: , Rfl:     EScitalopram oxalate (LEXAPRO) 20 MG tablet, Take 1 tablet (20 mg total) by mouth once daily., Disp: 30 tablet, Rfl: 2    hydrOXYzine pamoate (VISTARIL) 25 MG Cap, Take 1 capsule (25 mg total) by mouth 3 (three) times daily as needed (mild to moderate anxiety)., Disp: 90 capsule, Rfl: 2    mirtazapine (REMERON) 7.5 MG Tab, Take 1 tablet (7.5 mg total) by mouth every evening., Disp: 30 tablet, Rfl: 11    traZODone (DESYREL) 100 MG tablet, Take 2-3 tablets (200-300 mg total) by mouth nightly as needed for Insomnia., Disp: 90 tablet, Rfl: 2  No current facility-administered medications for this visit.     Social History     Tobacco Use   Smoking Status Never Smoker   Smokeless Tobacco Never Used        Review of patient's allergies indicates:  No Known Allergies     ASSESSMENT:   Encounter Diagnosis   Name Primary?    MDD (major depressive disorder), recurrent moderate (to severe in past) , without psychosis      Risk Assessment done: submitting for scanning / in my opinion / low at present / aware of social work: declines ; informed  aware 911 and ER if acute concerns     Patient Instructions   4-6 weeks [place] to find slot    No knive no [facility]  ECT info to [facility]    Counselot ms gasto n    Meds renew if issue let us know  Understanding  Expressed        Information below is FOR BACKGROUND / REFERENCE / ONCOLOGY NOTE May 2021:    Hitesh Mary MD (Attending)    HITESH MARY MD  [facility] MEDICAL ONCOLOGY  [address]  [address]  [place] [address]  Dept: [phone]     55 yo female referred for gastric carcinoid. Has lost weight over the last two years. Has autoimmune gastritis. Has loose stools frequently. No dietary changes. Was having left lower quadrant pain. Still happens occasionally. Had endoscopy in [place] in June. This showed chronic gastritis without tumor. Had an adenoma in duodenum. Has back pain as well. Upper right back pain. Has a mmg scheduled. She had a follow-up endoscopic ultrasound on 12/9/2020 which multiple biopsies were taken all negative for neuroendocrine cancer. They did show chronic gastritis. There was no tumor noted. She had an octreotide scan which was negative as well.    Pathology:  · 7/27/2020 thyroid left FNA benign follicular nodule  · 6/4/2020 gastric polyp-polypoid gastric mucosa with active chronic gastritis is present negative for H. pylori. No evidence of an adenoma. Small intestine normal. Gastric body chronic gastritis.  · 11/19/2020 duodenum no significant findings. Stomach well-differentiated neuroendocrine tumor 0.3 cm well-differentiated carcinoid. Less than 2 out of 10 high-powered fields mitotic rate. No lymphovascular perineural invasion. H. pylori negative. Random colon normal. Terminal ileum normal.  · 12/9/2020 stomach incisura chronic gastritis, antrum gastritis intestinal metaplasia, stomach chronic gastritis, stomach chronic gastritis, stomach fundus, chronic gastritis, stomach cardia chronic gastritis with intestinal metaplasia. Findings suggestive of autoimmune gastritis. No evidence of neuroendocrine neoplasm.     Radiology Studies:  · 3/12/2020 mammogram benign  · 2/11/2021 octreotide scan negative  · 4/24/2020 CT chest abdomen  pelvis-subcentimeter bilateral thyroid lobe nodules. 2 mm right middle lobe 3 mm right middle lobe. Abdomen pancreas normal stomach mild thickening.  · 7/31/2020 capsule endoscopy-unremarkable  · 3/12/2020 small bowel series normal  · 1/28/2020 pelvic ultrasound normal  · 1/28/2020 ultrasound thyroid stable thyroid nodules dominant left nodule has mildly suspicious features.  · 4/29/2021 CT CAP-no evidence of malignancy     Assessment/Plan:  1. Gastric carcinoid type I - recommend she have follow-up endoscopy at the end of this year. She will continue her B12 replacement. I will follow-up with her in a year.

## 2022-08-04 ENCOUNTER — HOSPITAL ENCOUNTER (EMERGENCY)
Facility: HOSPITAL | Age: 57
Discharge: HOME | End: 2022-08-05
Attending: EMERGENCY MEDICINE
Payer: MEDICAID

## 2022-08-04 DIAGNOSIS — Z13.9 ENCOUNTER FOR MEDICAL SCREENING EXAMINATION: Primary | ICD-10-CM

## 2022-08-04 DIAGNOSIS — R03.0 ELEVATED BLOOD PRESSURE READING: ICD-10-CM

## 2022-08-04 DIAGNOSIS — R52 PAIN: ICD-10-CM

## 2022-08-04 LAB
ALBUMIN SERPL-MCNC: 2.9 GM/DL (ref 3.5–5)
ALBUMIN/GLOB SERPL: 0.7 RATIO (ref 1.1–2)
ALP SERPL-CCNC: 86 UNIT/L (ref 40–150)
ALT SERPL-CCNC: 86 UNIT/L (ref 0–55)
AST SERPL-CCNC: 63 UNIT/L (ref 5–34)
BASOPHILS # BLD AUTO: 0.03 X10(3)/MCL (ref 0–0.2)
BASOPHILS NFR BLD AUTO: 0.3 %
BILIRUBIN DIRECT+TOT PNL SERPL-MCNC: 0.4 MG/DL
BUN SERPL-MCNC: 5 MG/DL (ref 9.8–20.1)
CALCIUM SERPL-MCNC: 9.5 MG/DL (ref 8.4–10.2)
CHLORIDE SERPL-SCNC: 107 MMOL/L (ref 98–107)
CO2 SERPL-SCNC: 25 MMOL/L (ref 22–29)
CREAT SERPL-MCNC: 0.69 MG/DL (ref 0.55–1.02)
EOSINOPHIL # BLD AUTO: 0.07 X10(3)/MCL (ref 0–0.9)
EOSINOPHIL NFR BLD AUTO: 0.8 %
ERYTHROCYTE [DISTWIDTH] IN BLOOD BY AUTOMATED COUNT: 12.4 % (ref 11.5–17)
GLOBULIN SER-MCNC: 4 GM/DL (ref 2.4–3.5)
GLUCOSE SERPL-MCNC: 115 MG/DL (ref 74–100)
HCT VFR BLD AUTO: 41.7 % (ref 37–47)
HGB BLD-MCNC: 13.1 GM/DL (ref 12–16)
IMM GRANULOCYTES # BLD AUTO: 0.08 X10(3)/MCL (ref 0–0.04)
IMM GRANULOCYTES NFR BLD AUTO: 0.9 %
LYMPHOCYTES # BLD AUTO: 1.15 X10(3)/MCL (ref 0.6–4.6)
LYMPHOCYTES NFR BLD AUTO: 12.8 %
MCH RBC QN AUTO: 29.4 PG (ref 27–31)
MCHC RBC AUTO-ENTMCNC: 31.4 MG/DL (ref 33–36)
MCV RBC AUTO: 93.5 FL (ref 80–94)
MONOCYTES # BLD AUTO: 0.74 X10(3)/MCL (ref 0.1–1.3)
MONOCYTES NFR BLD AUTO: 8.2 %
NEUTROPHILS # BLD AUTO: 6.9 X10(3)/MCL (ref 2.1–9.2)
NEUTROPHILS NFR BLD AUTO: 77 %
NRBC BLD AUTO-RTO: 0 %
PLATELET # BLD AUTO: 395 X10(3)/MCL (ref 130–400)
PMV BLD AUTO: 9.7 FL (ref 7.4–10.4)
POTASSIUM SERPL-SCNC: 4.5 MMOL/L (ref 3.5–5.1)
PROT SERPL-MCNC: 6.9 GM/DL (ref 6.4–8.3)
RBC # BLD AUTO: 4.46 X10(6)/MCL (ref 4.2–5.4)
SODIUM SERPL-SCNC: 141 MMOL/L (ref 136–145)
WBC # SPEC AUTO: 9 X10(3)/MCL (ref 4.5–11.5)

## 2022-08-04 PROCEDURE — 85025 COMPLETE CBC W/AUTO DIFF WBC: CPT | Performed by: EMERGENCY MEDICINE

## 2022-08-04 PROCEDURE — 99283 EMERGENCY DEPT VISIT LOW MDM: CPT | Mod: 25

## 2022-08-04 PROCEDURE — 80053 COMPREHEN METABOLIC PANEL: CPT | Performed by: EMERGENCY MEDICINE

## 2022-08-04 PROCEDURE — 36415 COLL VENOUS BLD VENIPUNCTURE: CPT | Performed by: EMERGENCY MEDICINE

## 2022-08-05 VITALS
TEMPERATURE: 97 F | WEIGHT: 97 LBS | HEIGHT: 62 IN | HEART RATE: 78 BPM | BODY MASS INDEX: 17.85 KG/M2 | DIASTOLIC BLOOD PRESSURE: 60 MMHG | OXYGEN SATURATION: 97 % | SYSTOLIC BLOOD PRESSURE: 95 MMHG | RESPIRATION RATE: 19 BRPM

## 2022-08-05 LAB
APPEARANCE UR: CLEAR
BACTERIA #/AREA URNS AUTO: ABNORMAL /HPF
BILIRUB UR QL STRIP.AUTO: NEGATIVE MG/DL
COLOR UR AUTO: YELLOW
GLUCOSE UR QL STRIP.AUTO: NEGATIVE MG/DL
KETONES UR QL STRIP.AUTO: NEGATIVE MG/DL
LEUKOCYTE ESTERASE UR QL STRIP.AUTO: ABNORMAL UNIT/L
NITRITE UR QL STRIP.AUTO: NEGATIVE
PH UR STRIP.AUTO: 7 [PH]
PROT UR QL STRIP.AUTO: NEGATIVE MG/DL
RBC #/AREA URNS AUTO: <5 /HPF
RBC UR QL AUTO: NEGATIVE UNIT/L
SP GR UR STRIP.AUTO: 1.01 (ref 1–1.03)
SQUAMOUS #/AREA URNS AUTO: <5 /HPF
UROBILINOGEN UR STRIP-ACNC: 1 MG/DL
WBC #/AREA URNS AUTO: 15 /HPF

## 2022-08-05 PROCEDURE — 81001 URINALYSIS AUTO W/SCOPE: CPT | Performed by: EMERGENCY MEDICINE

## 2022-08-05 NOTE — ED PROVIDER NOTES
"Encounter Date: 8/4/2022       History     Chief Complaint   Patient presents with    Hypertension     Recently admitted, treated at [facility] in  for serotonin syndrome. Medically cleared and transferred to [facility] at approx 1900. Sent to ED by MD from [facility] facility to be reevaluated D/T HTN, tachycardia. Pt has chronic neck and back pain. Complaints of full body pain at this time. States "feels like it is tightening up." +PEC/CEC, has sitter from [facility] present on arrival.      Patient is a 57-year-old female who was transferred from [facility] for evaluation for tachycardia and elevated blood pressure.  A caretaker from that facility is present and states patient had a systolic blood pressure of 156 when she arrived at their facility earlier tonight.  Patient is under PEC.  Patient has no complaints other than some chronic thoracic back pain.  Patient denies headache.  Patient denies any chest pain.  No fever chills.  Patient denies cough shortness of breath.  No abdominal pain, nausea, vomiting, or diarrhea.        Review of patient's allergies indicates:  No Known Allergies  Past Medical History:   Diagnosis Date    Anxiety disorder 12/29/2020     No past surgical history on file.  No family history on file.  Social History     Tobacco Use    Smoking status: Never Smoker    Smokeless tobacco: Never Used   Substance Use Topics    Alcohol use: Never    Drug use: Never     Review of Systems   Constitutional: Negative.    HENT: Negative.    Respiratory: Negative.    Cardiovascular: Negative.    Gastrointestinal: Negative.    Genitourinary: Negative.    Musculoskeletal: Positive for back pain.        Chronic thoracic back pain   Skin: Negative.    Neurological: Negative.    Psychiatric/Behavioral: Positive for dysphoric mood. Negative for hallucinations.       Physical Exam     Initial Vitals [08/04/22 2153]   BP Pulse Resp Temp SpO2   (!) 143/91 (!) 111 18 " 97.2 °F (36.2 °C) 99 %      MAP       --         Physical Exam    Nursing note and vitals reviewed.  Constitutional: She appears well-developed and well-nourished.   HENT:   Head: Normocephalic and atraumatic.   Eyes: Pupils are equal, round, and reactive to light.   Neck: Neck supple.   Normal range of motion.  Cardiovascular: Regular rhythm, normal heart sounds and intact distal pulses.   Mild tachycardia.   Pulmonary/Chest: Breath sounds normal. No respiratory distress. She has no wheezes. She has no rhonchi. She has no rales.   Abdominal: Abdomen is soft. She exhibits no distension. There is no abdominal tenderness. There is no guarding.   Musculoskeletal:         General: Normal range of motion.      Cervical back: Normal range of motion and neck supple.      Comments: Patient points to the thoracic back areas area of pain.  There is no tenderness to palpation over the thoracic or lumbar spine.  No swelling, no deformity or step-offs.     Neurological: She is alert and oriented to person, place, and time.   Skin: Skin is warm and dry.         ED Course   Procedures  Labs Reviewed   COMPREHENSIVE METABOLIC PANEL - Abnormal; Notable for the following components:       Result Value    Glucose Level 115 (*)     Blood Urea Nitrogen 5.0 (*)     Albumin Level 2.9 (*)     Globulin 4.0 (*)     Albumin/Globulin Ratio 0.7 (*)     Alanine Aminotransferase 86 (*)     Aspartate Aminotransferase 63 (*)     All other components within normal limits   URINALYSIS, REFLEX TO URINE CULTURE - Abnormal; Notable for the following components:    Leukocyte Esterase, UA 1+ (*)     All other components within normal limits   CBC WITH DIFFERENTIAL - Abnormal; Notable for the following components:    MCHC 31.4 (*)     IG# 0.08 (*)     All other components within normal limits   URINALYSIS, MICROSCOPIC - Abnormal; Notable for the following components:    WBC, UA 15 (*)     All other components within normal limits   CULTURE, URINE   CBC W/  AUTO DIFFERENTIAL    Narrative:     The following orders were created for panel order CBC auto differential.  Procedure                               Abnormality         Status                     ---------                               -----------         ------                     CBC with Differential[[id number]]        Abnormal            Final result                 Please view results for these tests on the individual orders.          Imaging Results    None          Medications - No data to display              ED Course as of 08/05/22 0523   Fri Aug 05, 2022   0158 Vital signs are stable, patient is in no apparent distress. [KG]   0158 Patient refused x-ray of the T-spine. [KG]   0519 I reexamined the patient, she is not displaying any signs of serotonin syndrome.  She has no tremor.  Her heart rate is in the low 90s.  Blood pressure is currently 125/75.  She is drinking water.  I ambulated patient for the 2nd time.  She does have a slight shuffling gait but she displays no rigidity.  She also has no rigidity of the upper extremities.  She has no tremor.  Her pupils are 3 mm bilaterally and reactive. [KG]   0520 I spoke with Dr. Cade, psychiatrist at [facility] about his concerns that patient may be displaying some symptoms of serotonin syndrome.  I have firm to him that I do not think that this is so, all vital signs have been stable while here.  He was also of the assumption that we had given patient IV fluids which we have not.  She does not have an IV.  She is taking p.o. fluids. [KG]      ED Course User Index  [KG] Pb Nobles MD             Clinical Impression:   Final diagnoses:  [R52] Pain  [Z13.9] Encounter for medical screening examination (Primary)  [R03.0] Elevated blood pressure reading          ED Disposition Condition    Discharge Stable        ED Prescriptions     None        Follow-up Information    None          Pb Nobles MD  08/05/22 0201       Pb Nobles,  MD  08/05/22 0523

## 2022-08-07 LAB — BACTERIA UR CULT: NORMAL

## 2022-08-24 ENCOUNTER — OFFICE VISIT (OUTPATIENT)
Dept: PSYCHIATRY | Facility: CLINIC | Age: 57
End: 2022-08-24
Payer: MEDICAID

## 2022-08-24 DIAGNOSIS — D3A.8 NEUROENDOCRINE TUMOR: ICD-10-CM

## 2022-08-24 DIAGNOSIS — T42.4X4S: ICD-10-CM

## 2022-08-24 DIAGNOSIS — F33.2 MDD (MAJOR DEPRESSIVE DISORDER), RECURRENT SEVERE, WITHOUT PSYCHOSIS: Primary | ICD-10-CM

## 2022-08-24 DIAGNOSIS — M54.9 BACK PAIN, UNSPECIFIED BACK LOCATION, UNSPECIFIED BACK PAIN LATERALITY, UNSPECIFIED CHRONICITY: ICD-10-CM

## 2022-08-24 DIAGNOSIS — G47.00 INSOMNIA, UNSPECIFIED TYPE: ICD-10-CM

## 2022-08-24 DIAGNOSIS — F41.9 ANXIETY DISORDER, UNSPECIFIED TYPE: ICD-10-CM

## 2022-08-24 DIAGNOSIS — Z98.890 HISTORY OF ELECTROCONVULSIVE THERAPY: ICD-10-CM

## 2022-08-24 DIAGNOSIS — M81.0 OSTEOPOROSIS, UNSPECIFIED OSTEOPOROSIS TYPE, UNSPECIFIED PATHOLOGICAL FRACTURE PRESENCE: ICD-10-CM

## 2022-08-24 PROCEDURE — 99215 PR OFFICE/OUTPT VISIT, EST, LEVL V, 40-54 MIN: ICD-10-PCS | Mod: AF,HB,95, | Performed by: PSYCHIATRY & NEUROLOGY

## 2022-08-24 PROCEDURE — 99417 PROLNG OP E/M EACH 15 MIN: CPT | Mod: AF,HB,95, | Performed by: PSYCHIATRY & NEUROLOGY

## 2022-08-24 PROCEDURE — 99417 PR PROLONGED SVC, OUTPT, W/WO DIRECT PT CONTACT,  EA ADDTL 15 MIN: ICD-10-PCS | Mod: AF,HB,95, | Performed by: PSYCHIATRY & NEUROLOGY

## 2022-08-24 PROCEDURE — 99215 OFFICE O/P EST HI 40 MIN: CPT | Mod: AF,HB,95, | Performed by: PSYCHIATRY & NEUROLOGY

## 2022-08-24 NOTE — PROGRESS NOTES
Gem Thomas   1965   08/24/2022      Disclaimer: Evaluation and treatment is based on information presented to date. Any new information may affect assessment and findings.       The patient location is: Patient's  Home  In [place], [place]    Who: pt and brother     Time 75 min     Visit type: Virtual visit with synchronous audio and video     Each patient to whom he or she provides medical services by telemedicine is: (1) informed of the relationship between the physician and patient and the respective role of any other health care provider with respect to management of the patient; and (2) notified that he or she may decline to receive medical services by telemedicine and may withdraw from such care at any time.    Patient was informed that I am a physician who is licensed in the state [facility]:  Kei Vizcarra MD:  Employed by   [facility] [employer]     See note from 7- in so far as Initial AFTERCARE visit S/P her Discharge from [facility]    Note:  She had a prior admission to  [facility] March 2021 / overdose; at that time said was not trying harm self     History Neuroendocrine Tumor    In past has worked as Mental Health tech in [place]    S: Patient's Own Perception of Condition (& Side Effects) : none / says sleeping well now     O:      CURRENT PRESENTATION:  TODAY 8-    Pt / brother explain that about 1 week after her last visit to me (7-25-22) she returned to [facility] ER and was kept in ER couple days and then on [facility] gen med limon with attendant 24 hr supervision UNTIL she was then [facility] [place].    No Records Accompany from [facility]    Brother goes on to explain that she was D/C from [facility] about aug 8 AND THEN family set up ECT treatments with [facility] ([facility]un[facility] MD[facility] in [place]. Pt/ brother inform that [facility] was not covered by their insurance; thus  pt making the trek into [place].    To date pt has had 3 ECT treatment and is scheduled for her 4th ECT treatment  Aug 26 2022 in [place] / [facility] with Dr Palmer.    As mutually agreed with pt / brother: pt will confer and see Dr Palmer for her care during period of ECT treatment. Pt informs that he is managing meds during this time.  Pt says fine for communication / to/ from Dr Vizcarra / Dr Palmer to coordinate care (eduardo as to coordinate when ECT treatments are over).    Pt states  She expect  Maximum  number of 12 treatments  / with 3 treatments a week; thus ECT treatments should be over mid September HOWEVER I did ask pt to confer with Dr Palmer as to any follow up visits he may want and for pt herself to confer with Dr Palmer as to her eventual plan for continuity of care AFTER ECT.    I did give pt an appt for Oct 5 2022 4:30p telehealth as next available HOWEVER noting that IF she would need earlier appt to message me or call [facility] dept ([phone]) and we will find a way to accommodate. Of course if any acute concerns call 911 or go to ER.    Understanding Expressed    Depression Patient Health Questionnaire 8/24/2022 6/13/2022 3/28/2022 2/7/2022 11/30/2021 8/3/2021 12/21/2020   Over the last two weeks how often have you been bothered by little interest or pleasure in doing things Several days More than half the days More than half the days More than half the days Several days Several days More than half the days   Over the last two weeks how often have you been bothered by feeling down, depressed or hopeless Nearly every day More than half the days Several days More than half the days Several days Several days More than half the days   PHQ-2 Total Score 4 4 3 4 2 2 4   Over the last two weeks how often have you been bothered by trouble falling or staying asleep, or sleeping too much Nearly every day Nearly every day Not at all Not at all Not at all Not at all Several days   Over the  last two weeks how often have you been bothered by feeling tired or having little energy Nearly every day Nearly every day Several days More than half the days Several days Several days More than half the days   Over the last two weeks how often have you been bothered by a poor appetite or overeating More than half the days Not at all Not at all More than half the days Not at all Not at all Not at all   Over the last two weeks how often have you been bothered by feeling bad about yourself - or that you are a failure or have let yourself or your family down More than half the days Several days Several days More than half the days Not at all Not at all Not at all   Over the last two weeks how often have you been bothered by trouble concentrating on things, such as reading the newspaper or watching television Nearly every day Not at all Not at all Several days Several days Several days More than half the days   Over the last two weeks how often have you been bothered by moving or speaking so slowly that other people could have noticed. Or the opposite - being so fidgety or restless that you have been moving around a lot more than usual. Nearly every day Several days Several days More than half the days Several days Several days Several days   Over the last two weeks how often have you been bothered by thoughts that you would be better off dead, or of hurting yourself Not at all Not at all Not at all Not at all Not at all Not at all Not at all   If you checked off any problems, how difficult have these problems made it for you to do your work, take care of things at home or get along with other people? Very difficult Somewhat difficult Not difficult at all Somewhat difficult Somewhat difficult Not difficult at all Somewhat difficult   Total Score 20 12 6 13 5 5 10   Interpretation Severe Moderate Mild Moderate Mild Mild Moderate     GAD7 8/24/2022 6/10/2022 3/25/2022   1. Feeling nervous, anxious, or on edge? 3 1 1   2.  Not being able to stop or control worrying? 2 1 1   3. Worrying too much about different things? 3 1 1   4. Trouble relaxing? 3 1 1   5. Being so restless that it is hard to sit still? 3 1 1   6. Becoming easily annoyed or irritable? 2 1 1   7. Feeling afraid as if something awful might happen? 2 1 0   8. If you checked off any problems, how difficult have these problems made it for you to do your work, take care of things at home, or get along with other people? 2 - -   ALEX-7 Score 18 7 6       Weight 97 lb / reminded her to follow up with primary care     Wt Readings from Last 3 Encounters:   08/04/22 44 kg (97 lb)   07/25/22 44.4 kg (97 lb 14.2 oz)   06/13/22 47.2 kg (104 lb)     Has struggled with chronic pain as well / lower back     Dad passed early Dec / mom is functional / lives with pt  in [place]; says sister checks on her as well    Dr Verma office (affiliated with [facility]) is PCP; says he expressed some interst in my psyc taking look at cymbalta  treid such    Yet (Mar 28 2022) she noted Cymbalta 30 mg did not seem to be doing much for her ; we discussed moving to  Cymbalta 60 mg or reverting to Lexapro 20 mg; she express preference to move back to Lexapro 20 and D/C Cymbalta; such done    Historically had reported  Ativan 1 mg TID  Helped tho now 2nd overdose     Goal directed calm / says little / answers questions asked    No psychosis    denies SI ; denies HI     Note: of Stephanie Verma MD [facility] Jan 2021   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 Oncology recommended repeat endoscopy to be done about a month ago. She has not had this done. She has not seen GI.    Constitutional Health Concerns:      Review of Systems   Constitutional: Negative.    HENT: Negative.    Eyes: Negative.    Respiratory: Negative.    Cardiovascular: Negative.    Gastrointestinal:        Has had weight loss    Genitourinary: Negative.    Musculoskeletal: mild tremor R hand   Skin: Negative.   "  Neurological:   Endo/Heme/Allergies: Negative.    Psyc: ECT treatemnts ongoing Aug 2022 / Yunior Palmer MD [facility].      Mental Status Exam:      Oriented: x 3   Attitude: reserved   Mood: says doing bit better tho still depressed   Cognition: alert  Concentration: grossly intact     Affect: Shallow    Anxiety: mild      Thought Process: goal directed   Content:     Speech:       Volume : WNL       Quantity WNL       Quality: appears to openly answer questions      Threats: no SI / no HI     Psychosis: denies all      Estimate of Intellectual Function: average   Impulse Control: no thoughts of harm to self/ others     Social: see admit psyc eval / has sister / brother  supportive / elderly mom lives with her; dad passed Dec 2021; says little of interests / involvement with others    Patient Active Problem List   Diagnosis    Depression, major, recurrent, moderate    Anxiety disorder    Panic disorder without agoraphobia    Pulmonary nodules    Neuroendocrine tumor    Depression    Atrophic gastritis without hemorrhage    Lacerations of multiple sites of left arm    Malnutrition of mild degree    MDD (major depressive disorder), recurrent severe, without psychosis    Overdose of benzodiazepine (2021), (says was trying to sleep DENIES any intent self harm ) admitted to [facility]    Osteoporosis    Back pain    Insomnia    BMI less than 19, adult (7-25-22: 17.9 97 lb 5'2")    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    MDD (major depressive disorder), recurrent moderate (to severe) , without psychosis Yes    ElectroConvulsive Therapy (ECT) Aug 2022 [facility] Yunior Fulton MD     Overdose x 2  ONE: benzodiazepine (2021) , (says was trying to " "sleep denied any intent self harm ) > [facility]; TWO: July 2022: OD on Ativan > [place]     Anxiety disorder, unspecified type     Neuroendocrine tumor     Insomnia, unspecified type     BMI less than 19, adult (7-25-22: 17.9 97 lb 5'2")     Osteoporosis, unspecified osteoporosis type, (pt reports as of Aug 2021)     Back pain, thoracic and some lumbar; mailly R side says varies tho often gets to 10 of 10; sicne 8 months      Risk Assessment done: submitting for scanning / in my opinion / low at present / aware of social work: declines ; informed  aware 911 and ER if acute concerns     Patient Instructions       PLAN:     As mutually agreed as she in midst ECT treatment / she will confer with Dr Palmer re management meds during this time / tho ware IF any challenges to re-contact me    She did say fine for any communication to/from Dr Vizcarra & Dr Palmer /joon coordination of her care     Follow up:   TOYA Vizcarra MD / Oct 5  2022 Telehealth 4:30p      9/12/2022 11:00 AM NEW PATIENT - VIRTUAL [facility] - Psychiatry [place] Veloz    Location Instructions:    [place] [address]     Psychiatry Medication:   See after visit summary       References:     Relaxation stress reduction workbook: CATHERINE Toney PhD ( used: $7-10)    Feeling Good Website: Kei Bolanos MD / www.[email or web address] website (free) / eduardo. PODCASTS    Anxiety &  phobia workbook by ERIC Thompson PhD  (web retailers: used: $ 7-10)    [place]: Path to Better Sleep : https://www.veterantraining.[place].gov/insomnia/ (free)    [place]uit [place]: http://www.quitwithusla.org/    (and other resources as per counselor, if applicable)     Pt expressed appreciation for the visit today and did not have further question at this time though pt  was still informed to:     Call / Report Side Effects to Psyc MD     Encouraged to follow up with primary care / Gen Med MD for continued monitoring of general health and wellness.    Understanding was expressed; and no " further concerns nor questions were raised at this time.     remember healthy self care:   eat right  attempt adequate rest   HANDWASHING / encourage such eduardo. During this corona virus time   walk or light exercise within reason and as your general med team approves  read or explore any of reference materials / homework mentioned  reach out (I.e.,  connect with)  others who nuture and bring out best in you  avoid risky behaviors  keep your appointments  IF you  cannot make your appt THEN please call or go online to reschedule.  avoid  alcohol and illicit substances.  Look for the positive.  All is often relative-seek balance  Call [facility]  if questions : [phone]   Call 911 or go to Emergency Room  (ER)  if any acute concerns          Information below is FOR BACKGROUND / REFERENCE / ONCOLOGY NOTE May 2021:    Hitesh Mary MD (Attending)    HITESH MARY MD  [facility]  [address]  [facility] [place] [address]  Dept: [phone]     57 yo female referred for gastric carcinoid. Has lost weight over the last two years. Has autoimmune gastritis. Has loose stools frequently. No dietary changes. Was having left lower quadrant pain. Still happens occasionally. Had endoscopy in [place] in June. This showed chronic gastritis without tumor. Had an adenoma in duodenum. Has back pain as well. Upper right back pain. Has a mmg scheduled. She had a follow-up endoscopic ultrasound on 12/9/2020 which multiple biopsies were taken all negative for neuroendocrine cancer. They did show chronic gastritis. There was no tumor noted. She had an octreotide scan which was negative as well.    Pathology:  · 7/27/2020 thyroid left FNA benign follicular nodule  · 6/4/2020 gastric polyp-polypoid gastric mucosa with active chronic gastritis is present negative for H. pylori. No evidence of an adenoma. Small intestine normal. Gastric body chronic gastritis.  ·  11/19/2020 duodenum no significant findings. Stomach well-differentiated neuroendocrine tumor 0.3 cm well-differentiated carcinoid. Less than 2 out of 10 high-powered fields mitotic rate. No lymphovascular perineural invasion. H. pylori negative. Random colon normal. Terminal ileum normal.  · 12/9/2020 stomach incisura chronic gastritis, antrum gastritis intestinal metaplasia, stomach chronic gastritis, stomach chronic gastritis, stomach fundus, chronic gastritis, stomach cardia chronic gastritis with intestinal metaplasia. Findings suggestive of autoimmune gastritis. No evidence of neuroendocrine neoplasm.     Radiology Studies:  · 3/12/2020 mammogram benign  · 2/11/2021 octreotide scan negative  · 4/24/2020 CT chest abdomen pelvis-subcentimeter bilateral thyroid lobe nodules. 2 mm right middle lobe 3 mm right middle lobe. Abdomen pancreas normal stomach mild thickening.  · 7/31/2020 capsule endoscopy-unremarkable  · 3/12/2020 small bowel series normal  · 1/28/2020 pelvic ultrasound normal  · 1/28/2020 ultrasound thyroid stable thyroid nodules dominant left nodule has mildly suspicious features.  · 4/29/2021 CT CAP-no evidence of malignancy     Assessment/Plan:  1. Gastric carcinoid type I - recommend she have follow-up endoscopy at the end of this year. She will continue her B12 replacement. I will follow-up with her in a year.

## 2022-08-24 NOTE — PATIENT INSTRUCTIONS
PLAN:     As mutually agreed as she in midst ECT treatment / she will confer with Dr Palmer re management meds during this time / tho ware IF any challenges to re-contact me    She did say fine for any communication to/from Dr Vizcarra & Dr Palmer /joon coordination of her care     Follow up:   TOYA Vizcarra MD / Oct 5  2022 Telehealth 4:30p      9/12/2022 11:00 AM NEW PATIENT - [facility] - Psychiatry [place] FVictorina Magdiel    Location Instructions:    [place] [address]     Psychiatry Medication:   See after visit summary       References:     Relaxation stress reduction workbook: CATHERINE Toney PhD ( used: $7-10)    Feeling Good Website: Kei Bolanos MD / www.[employer] website (free) / eduardo. PODCASTS    Anxiety &  phobia workbook by MARQUISE Thompson PhD  (web retailers: used: $ 7-10)    [place]: Path to Better Sleep : https://www.veterantraining.[place].gov/insomnia/ (free)    [place]: http://www.quitwithusla.org/    (and other resources as per counselor, if applicable)     Pt expressed appreciation for the visit today and did not have further question at this time though pt  was still informed to:     Call / Report Side Effects to Psyc MD     Encouraged to follow up with primary care / Gen Med MD for continued monitoring of general health and wellness.    Understanding was expressed; and no further concerns nor questions were raised at this time.     remember healthy self care:   eat right  attempt adequate rest   HANDWASHING / encourage such eduardo. During this corona virus time   walk or light exercise within reason and as your general med team approves  read or explore any of reference materials / homework mentioned  reach out (I.e.,  connect with)  others who nuture and bring out best in you  avoid risky behaviors  keep your appointments  IF you  cannot make your appt THEN please call or go online to reschedule.  avoid  alcohol and illicit substances.  Look for the positive.  All is often relative-seek balance  Call  Behavioral Health Clinic  if questions : [phone]   Call 911 or go to Emergency Room  (ER)  if any acute concerns

## 2022-09-12 ENCOUNTER — OFFICE VISIT (OUTPATIENT)
Dept: PSYCHIATRY | Facility: CLINIC | Age: 57
End: 2022-09-12
Payer: MEDICAID

## 2022-09-12 DIAGNOSIS — R69 DIAGNOSIS DEFERRED: Primary | ICD-10-CM

## 2022-09-12 PROCEDURE — 99499 NO LOS: ICD-10-PCS | Mod: AJ,HB,95, | Performed by: SOCIAL WORKER

## 2022-09-12 PROCEDURE — 99499 UNLISTED E&M SERVICE: CPT | Mod: AJ,HB,95, | Performed by: SOCIAL WORKER

## 2022-09-12 NOTE — PROGRESS NOTES
This patient arrived on time for her online appointment. Her son was also present at the time. It soon became evident that the patient was not in [place], but was at the son's home in [facility]. The session was stopped and an explanation was given to the patient and her son that this LCSW could not do sessions in which the patient is located out of state. The patient and son indicated understanding of this predicament and agreed to make another appointment when the patient had returned to her home in [place].

## 2022-10-10 ENCOUNTER — PATIENT MESSAGE (OUTPATIENT)
Dept: PSYCHIATRY | Facility: CLINIC | Age: 57
End: 2022-10-10
Payer: MEDICAID

## 2022-11-09 ENCOUNTER — OFFICE VISIT (OUTPATIENT)
Dept: PSYCHIATRY | Facility: CLINIC | Age: 57
End: 2022-11-09
Payer: MEDICAID

## 2022-11-09 ENCOUNTER — PATIENT MESSAGE (OUTPATIENT)
Dept: PSYCHIATRY | Facility: CLINIC | Age: 57
End: 2022-11-09

## 2022-11-09 DIAGNOSIS — F41.0 PANIC DISORDER WITHOUT AGORAPHOBIA: ICD-10-CM

## 2022-11-09 DIAGNOSIS — F43.29 PROLONGED GRIEF REACTION: ICD-10-CM

## 2022-11-09 DIAGNOSIS — M54.9 BACK PAIN, UNSPECIFIED BACK LOCATION, UNSPECIFIED BACK PAIN LATERALITY, UNSPECIFIED CHRONICITY: ICD-10-CM

## 2022-11-09 DIAGNOSIS — Z98.890 HISTORY OF ELECTROCONVULSIVE THERAPY: ICD-10-CM

## 2022-11-09 DIAGNOSIS — G47.00 INSOMNIA, UNSPECIFIED TYPE: ICD-10-CM

## 2022-11-09 DIAGNOSIS — T42.4X4S: ICD-10-CM

## 2022-11-09 DIAGNOSIS — M81.0 OSTEOPOROSIS, UNSPECIFIED OSTEOPOROSIS TYPE, UNSPECIFIED PATHOLOGICAL FRACTURE PRESENCE: ICD-10-CM

## 2022-11-09 DIAGNOSIS — F41.9 ANXIETY DISORDER, UNSPECIFIED TYPE: ICD-10-CM

## 2022-11-09 DIAGNOSIS — F33.2 MDD (MAJOR DEPRESSIVE DISORDER), RECURRENT SEVERE, WITHOUT PSYCHOSIS: Primary | ICD-10-CM

## 2022-11-09 DIAGNOSIS — D3A.8 NEUROENDOCRINE TUMOR: ICD-10-CM

## 2022-11-09 PROCEDURE — 99999 PR PBB SHADOW E&M-EST. PATIENT-LVL I: CPT | Mod: PBBFAC,AF,HB, | Performed by: PSYCHIATRY & NEUROLOGY

## 2022-11-09 PROCEDURE — 99499 UNLISTED E&M SERVICE: CPT | Mod: S$PBB,AF,HB, | Performed by: PSYCHIATRY & NEUROLOGY

## 2022-11-09 PROCEDURE — 99499 NO LOS: ICD-10-PCS | Mod: S$PBB,AF,HB, | Performed by: PSYCHIATRY & NEUROLOGY

## 2022-11-09 PROCEDURE — 99211 OFF/OP EST MAY X REQ PHY/QHP: CPT | Mod: PBBFAC | Performed by: PSYCHIATRY & NEUROLOGY

## 2022-11-09 PROCEDURE — 99999 PR PBB SHADOW E&M-EST. PATIENT-LVL I: ICD-10-PCS | Mod: PBBFAC,AF,HB, | Performed by: PSYCHIATRY & NEUROLOGY

## 2022-11-09 NOTE — PATIENT INSTRUCTIONS
Audio video tho was established tho was unable to hold session as Ms Thomas is in [place]    Pt is in [place] visiting  her  adult son sravani saying that she does not know when returning; said varied between few weeks and 2 months.    Note Such was after her ECT treatemtns at [facility] (by Yunior Palmer MD)     She was able to says doing ok and no acute concerns    She was calm goal directed    She did inform me that her Psyc D/C meds   Celexa 20 mg  Propranolol 10 mg TID   Valium 2 mg TID    Says she has list meds as given to her by last treating kim BLANCHARD who did her ECT Yunior Palmer MD of [facility]    She was directed to seek local provider in [place] / she happens to  be  located in same zip code as [facility] Dept psychiatry as well as other ps providers of choice    [facility]  [address] [place] [address]  [phone]  [email or web address]     She was told to contact our dept and schedule a follow up appt PRIOR to her return to [place]    Understanding Expressed    D Post MD

## 2022-11-09 NOTE — PROGRESS NOTES
Audio video tho was established tho was unable to hold session as Ms Thomas is in [place]    Pt is in [place] visiting  her  adult son sravani saying that she does not know when returning; said varied between few weeks and 2 months.    She was able to says doing ok and no acute concerns    She was calm goal directed    She did inform me that she is taking   Celexa 20 mg  Propranolol 10 mg TID   Valium 2 mg TID    Says she has list meds as given to her by last treating kim BLANCHARD who did her ECT Yunior Palmer MD of [facility]    She was directed to seek local provider in [place] / she happens to  be  located in same zip code as [facility] Dept psychiatry as well as other psyc providers of choice    [facility]  [address] [place] [address]  [phone]  [email or web address]     She was told to contact our dept and schedule a follow up appt PRIOR to her return to [place]    Understanding Expressed    D Post MD

## 2023-03-07 ENCOUNTER — PATIENT MESSAGE (OUTPATIENT)
Dept: PSYCHIATRY | Facility: CLINIC | Age: 58
End: 2023-03-07
Payer: MEDICAID

## 2023-03-07 ENCOUNTER — TELEPHONE (OUTPATIENT)
Dept: PSYCHIATRY | Facility: CLINIC | Age: 58
End: 2023-03-07
Payer: MEDICAID

## 2023-03-17 ENCOUNTER — PATIENT MESSAGE (OUTPATIENT)
Dept: RESEARCH | Facility: HOSPITAL | Age: 58
End: 2023-03-17
Payer: MEDICAID

## 2023-03-27 ENCOUNTER — TELEPHONE (OUTPATIENT)
Dept: PSYCHIATRY | Facility: CLINIC | Age: 58
End: 2023-03-27
Payer: MEDICAID

## 2023-03-27 ENCOUNTER — PATIENT MESSAGE (OUTPATIENT)
Dept: PSYCHIATRY | Facility: CLINIC | Age: 58
End: 2023-03-27
Payer: MEDICAID

## 2023-03-31 ENCOUNTER — TELEPHONE (OUTPATIENT)
Dept: PSYCHIATRY | Facility: CLINIC | Age: 58
End: 2023-03-31
Payer: MEDICAID

## 2023-03-31 NOTE — TELEPHONE ENCOUNTER
Medical Necessity Clause: This procedure was medically necessary because the lesions that were treated were: irritated and inflamed. lvm to return call to schedule with new provider   Number Of Freeze-Thaw Cycles: 3 freeze-thaw cycles Duration Of Freeze Thaw-Cycle (Seconds): 10 Render Post-Care Instructions In Note?: no Consent: The patient's consent was obtained including but not limited to risks of crusting, scabbing, blistering, scarring, darker or lighter pigmentary change, recurrence, incomplete removal and infection. Medical Necessity Information: It is in your best interest to select a reason for this procedure from the list below. All of these items fulfill various CMS LCD requirements except the new and changing color options. Detail Level: Simple Post-Care Instructions: I reviewed with the patient in detail post-care instructions. Patient is to wear sunprotection, and avoid picking at any of the treated lesions. Pt may apply Vaseline to crusted or scabbing areas.